# Patient Record
Sex: MALE | Race: WHITE | NOT HISPANIC OR LATINO
[De-identification: names, ages, dates, MRNs, and addresses within clinical notes are randomized per-mention and may not be internally consistent; named-entity substitution may affect disease eponyms.]

---

## 2018-06-12 PROBLEM — Z00.00 ENCOUNTER FOR PREVENTIVE HEALTH EXAMINATION: Status: ACTIVE | Noted: 2018-06-12

## 2018-06-13 ENCOUNTER — APPOINTMENT (OUTPATIENT)
Dept: ORTHOPEDIC SURGERY | Facility: CLINIC | Age: 38
End: 2018-06-13
Payer: MEDICAID

## 2018-06-13 VITALS
HEIGHT: 66 IN | BODY MASS INDEX: 30.05 KG/M2 | DIASTOLIC BLOOD PRESSURE: 82 MMHG | SYSTOLIC BLOOD PRESSURE: 120 MMHG | WEIGHT: 187 LBS | HEART RATE: 74 BPM

## 2018-06-13 DIAGNOSIS — M25.512 PAIN IN RIGHT SHOULDER: ICD-10-CM

## 2018-06-13 DIAGNOSIS — M25.511 PAIN IN RIGHT SHOULDER: ICD-10-CM

## 2018-06-13 DIAGNOSIS — M25.311 OTHER INSTABILITY, RIGHT SHOULDER: ICD-10-CM

## 2018-06-13 PROCEDURE — 99203 OFFICE O/P NEW LOW 30 MIN: CPT

## 2018-06-13 PROCEDURE — 73030 X-RAY EXAM OF SHOULDER: CPT | Mod: RT

## 2018-06-15 ENCOUNTER — APPOINTMENT (OUTPATIENT)
Dept: ORTHOPEDIC SURGERY | Facility: CLINIC | Age: 38
End: 2018-06-15
Payer: MEDICAID

## 2018-06-15 DIAGNOSIS — M54.2 CERVICALGIA: ICD-10-CM

## 2018-06-15 DIAGNOSIS — M54.6 PAIN IN THORACIC SPINE: ICD-10-CM

## 2018-06-15 PROCEDURE — 99214 OFFICE O/P EST MOD 30 MIN: CPT

## 2018-06-15 PROCEDURE — 72040 X-RAY EXAM NECK SPINE 2-3 VW: CPT

## 2018-06-15 PROCEDURE — 72070 X-RAY EXAM THORAC SPINE 2VWS: CPT

## 2019-02-13 ENCOUNTER — APPOINTMENT (OUTPATIENT)
Dept: INTERNAL MEDICINE | Facility: CLINIC | Age: 39
End: 2019-02-13
Payer: COMMERCIAL

## 2019-02-13 VITALS
HEIGHT: 64.5 IN | HEART RATE: 71 BPM | OXYGEN SATURATION: 96 % | WEIGHT: 200 LBS | SYSTOLIC BLOOD PRESSURE: 130 MMHG | BODY MASS INDEX: 33.73 KG/M2 | DIASTOLIC BLOOD PRESSURE: 80 MMHG | RESPIRATION RATE: 14 BRPM | TEMPERATURE: 98 F

## 2019-02-13 PROCEDURE — 99204 OFFICE O/P NEW MOD 45 MIN: CPT

## 2019-02-13 RX ORDER — AMOXICILLIN AND CLAVULANATE POTASSIUM 875; 125 MG/1; MG/1
875-125 TABLET, COATED ORAL
Qty: 20 | Refills: 0 | Status: DISCONTINUED | COMMUNITY
Start: 2018-01-28 | End: 2019-02-13

## 2019-02-13 NOTE — PHYSICAL EXAM
[No Acute Distress] : no acute distress [Well Nourished] : well nourished [Well Developed] : well developed [Well-Appearing] : well-appearing [Normal Voice/Communication] : normal voice/communication [Normal Sclera/Conjunctiva] : normal sclera/conjunctiva [PERRL] : pupils equal round and reactive to light [EOMI] : extraocular movements intact [Normal Outer Ear/Nose] : the outer ears and nose were normal in appearance [Normal Oropharynx] : the oropharynx was normal [Normal TMs] : both tympanic membranes were normal [No JVD] : no jugular venous distention [Supple] : supple [No Lymphadenopathy] : no lymphadenopathy [Thyroid Normal, No Nodules] : the thyroid was normal and there were no nodules present [No Respiratory Distress] : no respiratory distress  [Clear to Auscultation] : lungs were clear to auscultation bilaterally [No Accessory Muscle Use] : no accessory muscle use [Normal Rate] : normal rate  [Regular Rhythm] : with a regular rhythm [Normal S1, S2] : normal S1 and S2 [No Murmur] : no murmur heard [No Carotid Bruits] : no carotid bruits [No Abdominal Bruit] : a ~M bruit was not heard ~T in the abdomen [No Varicosities] : no varicosities [Pedal Pulses Present] : the pedal pulses are present [No Edema] : there was no peripheral edema [No Extremity Clubbing/Cyanosis] : no extremity clubbing/cyanosis [No Palpable Aorta] : no palpable aorta [Soft] : abdomen soft [Non Tender] : non-tender [Non-distended] : non-distended [No Masses] : no abdominal mass palpated [No HSM] : no HSM [Normal Bowel Sounds] : normal bowel sounds [Normal Posterior Cervical Nodes] : no posterior cervical lymphadenopathy [Normal Anterior Cervical Nodes] : no anterior cervical lymphadenopathy [No CVA Tenderness] : no CVA  tenderness [No Spinal Tenderness] : no spinal tenderness [No Joint Swelling] : no joint swelling [Grossly Normal Strength/Tone] : grossly normal strength/tone [No Rash] : no rash [Normal Gait] : normal gait [Coordination Grossly Intact] : coordination grossly intact [No Focal Deficits] : no focal deficits [Deep Tendon Reflexes (DTR)] : deep tendon reflexes were 2+ and symmetric [Speech Grossly Normal] : speech grossly normal [Memory Grossly Normal] : memory grossly normal [Normal Affect] : the affect was normal [Alert and Oriented x3] : oriented to person, place, and time [Normal Insight/Judgement] : insight and judgment were intact

## 2019-03-30 ENCOUNTER — APPOINTMENT (OUTPATIENT)
Dept: INTERNAL MEDICINE | Facility: CLINIC | Age: 39
End: 2019-03-30
Payer: COMMERCIAL

## 2019-03-30 VITALS
DIASTOLIC BLOOD PRESSURE: 80 MMHG | SYSTOLIC BLOOD PRESSURE: 130 MMHG | OXYGEN SATURATION: 98 % | RESPIRATION RATE: 14 BRPM | HEART RATE: 87 BPM | WEIGHT: 200 LBS | TEMPERATURE: 98 F | BODY MASS INDEX: 33.73 KG/M2 | HEIGHT: 64.5 IN

## 2019-03-30 PROCEDURE — 99214 OFFICE O/P EST MOD 30 MIN: CPT

## 2019-03-30 NOTE — PHYSICAL EXAM

## 2019-04-29 ENCOUNTER — APPOINTMENT (OUTPATIENT)
Dept: INTERNAL MEDICINE | Facility: CLINIC | Age: 39
End: 2019-04-29
Payer: COMMERCIAL

## 2019-04-29 VITALS
DIASTOLIC BLOOD PRESSURE: 70 MMHG | WEIGHT: 202 LBS | SYSTOLIC BLOOD PRESSURE: 120 MMHG | TEMPERATURE: 97.8 F | OXYGEN SATURATION: 98 % | BODY MASS INDEX: 34.07 KG/M2 | HEART RATE: 78 BPM | RESPIRATION RATE: 14 BRPM | HEIGHT: 64.5 IN

## 2019-04-29 PROCEDURE — 99214 OFFICE O/P EST MOD 30 MIN: CPT

## 2019-04-29 RX ORDER — PREDNISONE 20 MG/1
20 TABLET ORAL
Qty: 10 | Refills: 0 | Status: DISCONTINUED | COMMUNITY
Start: 2019-01-30

## 2019-06-06 ENCOUNTER — MEDICATION RENEWAL (OUTPATIENT)
Age: 39
End: 2019-06-06

## 2019-07-01 ENCOUNTER — APPOINTMENT (OUTPATIENT)
Dept: INTERNAL MEDICINE | Facility: CLINIC | Age: 39
End: 2019-07-01
Payer: COMMERCIAL

## 2019-07-01 ENCOUNTER — APPOINTMENT (OUTPATIENT)
Dept: INTERNAL MEDICINE | Facility: CLINIC | Age: 39
End: 2019-07-01

## 2019-07-01 VITALS
HEIGHT: 64.5 IN | WEIGHT: 202 LBS | DIASTOLIC BLOOD PRESSURE: 80 MMHG | RESPIRATION RATE: 14 BRPM | HEART RATE: 95 BPM | OXYGEN SATURATION: 98 % | TEMPERATURE: 98.2 F | SYSTOLIC BLOOD PRESSURE: 130 MMHG | BODY MASS INDEX: 34.07 KG/M2

## 2019-07-01 PROCEDURE — 99214 OFFICE O/P EST MOD 30 MIN: CPT

## 2019-07-01 RX ORDER — CEFDINIR 300 MG/1
300 CAPSULE ORAL
Qty: 20 | Refills: 0 | Status: DISCONTINUED | COMMUNITY
Start: 2019-06-03

## 2019-07-01 NOTE — HEALTH RISK ASSESSMENT
[No] : In the past 12 months have you used drugs other than those required for medical reasons? No [0] : 2) Feeling down, depressed, or hopeless: Not at all (0) [] : No

## 2019-07-01 NOTE — PHYSICAL EXAM
[No Acute Distress] : no acute distress [Well Nourished] : well nourished [Well Developed] : well developed [Well-Appearing] : well-appearing [Normal Sclera/Conjunctiva] : normal sclera/conjunctiva [PERRL] : pupils equal round and reactive to light [EOMI] : extraocular movements intact [Normal Outer Ear/Nose] : the outer ears and nose were normal in appearance [Normal Oropharynx] : the oropharynx was normal [No JVD] : no jugular venous distention [No Lymphadenopathy] : no lymphadenopathy [Supple] : supple [Thyroid Normal, No Nodules] : the thyroid was normal and there were no nodules present [No Respiratory Distress] : no respiratory distress  [No Accessory Muscle Use] : no accessory muscle use [Clear to Auscultation] : lungs were clear to auscultation bilaterally [Normal Rate] : normal rate  [Regular Rhythm] : with a regular rhythm [Normal S1, S2] : normal S1 and S2 [No Murmur] : no murmur heard [No Carotid Bruits] : no carotid bruits [No Abdominal Bruit] : a ~M bruit was not heard ~T in the abdomen [No Varicosities] : no varicosities [Pedal Pulses Present] : the pedal pulses are present [No Edema] : there was no peripheral edema [No Palpable Aorta] : no palpable aorta [No Extremity Clubbing/Cyanosis] : no extremity clubbing/cyanosis [Soft] : abdomen soft [Non Tender] : non-tender [Non-distended] : non-distended [No Masses] : no abdominal mass palpated [No HSM] : no HSM [Normal Bowel Sounds] : normal bowel sounds [Normal Posterior Cervical Nodes] : no posterior cervical lymphadenopathy [Normal Anterior Cervical Nodes] : no anterior cervical lymphadenopathy [No CVA Tenderness] : no CVA  tenderness [No Spinal Tenderness] : no spinal tenderness [No Joint Swelling] : no joint swelling [Grossly Normal Strength/Tone] : grossly normal strength/tone [No Rash] : no rash [Coordination Grossly Intact] : coordination grossly intact [No Focal Deficits] : no focal deficits [Normal Gait] : normal gait [Deep Tendon Reflexes (DTR)] : deep tendon reflexes were 2+ and symmetric [Normal Affect] : the affect was normal [Normal Insight/Judgement] : insight and judgment were intact

## 2019-07-29 ENCOUNTER — MEDICATION RENEWAL (OUTPATIENT)
Age: 39
End: 2019-07-29

## 2019-08-21 ENCOUNTER — APPOINTMENT (OUTPATIENT)
Dept: INTERNAL MEDICINE | Facility: CLINIC | Age: 39
End: 2019-08-21
Payer: COMMERCIAL

## 2019-08-21 VITALS
DIASTOLIC BLOOD PRESSURE: 90 MMHG | WEIGHT: 200 LBS | SYSTOLIC BLOOD PRESSURE: 120 MMHG | BODY MASS INDEX: 33.8 KG/M2 | OXYGEN SATURATION: 97 % | HEART RATE: 84 BPM | TEMPERATURE: 97.7 F

## 2019-08-21 PROCEDURE — 99214 OFFICE O/P EST MOD 30 MIN: CPT

## 2019-09-20 ENCOUNTER — MEDICATION RENEWAL (OUTPATIENT)
Age: 39
End: 2019-09-20

## 2019-09-30 ENCOUNTER — APPOINTMENT (OUTPATIENT)
Dept: INTERNAL MEDICINE | Facility: CLINIC | Age: 39
End: 2019-09-30
Payer: COMMERCIAL

## 2019-09-30 ENCOUNTER — APPOINTMENT (OUTPATIENT)
Dept: INTERNAL MEDICINE | Facility: CLINIC | Age: 39
End: 2019-09-30

## 2019-09-30 VITALS
TEMPERATURE: 98 F | HEIGHT: 64.5 IN | OXYGEN SATURATION: 98 % | RESPIRATION RATE: 14 BRPM | WEIGHT: 202 LBS | SYSTOLIC BLOOD PRESSURE: 118 MMHG | BODY MASS INDEX: 34.07 KG/M2 | DIASTOLIC BLOOD PRESSURE: 76 MMHG | HEART RATE: 76 BPM

## 2019-09-30 PROCEDURE — 99214 OFFICE O/P EST MOD 30 MIN: CPT

## 2019-09-30 NOTE — PHYSICAL EXAM
[No Acute Distress] : no acute distress [Well Nourished] : well nourished [Well Developed] : well developed [Well-Appearing] : well-appearing [Normal Sclera/Conjunctiva] : normal sclera/conjunctiva [PERRL] : pupils equal round and reactive to light [EOMI] : extraocular movements intact [Normal Outer Ear/Nose] : the outer ears and nose were normal in appearance [Normal Oropharynx] : the oropharynx was normal [No JVD] : no jugular venous distention [No Lymphadenopathy] : no lymphadenopathy [Supple] : supple [Thyroid Normal, No Nodules] : the thyroid was normal and there were no nodules present [No Respiratory Distress] : no respiratory distress  [No Accessory Muscle Use] : no accessory muscle use [Clear to Auscultation] : lungs were clear to auscultation bilaterally [Normal Rate] : normal rate  [Regular Rhythm] : with a regular rhythm [Normal S1, S2] : normal S1 and S2 [No Murmur] : no murmur heard [No Carotid Bruits] : no carotid bruits [No Abdominal Bruit] : a ~M bruit was not heard ~T in the abdomen [No Varicosities] : no varicosities [Pedal Pulses Present] : the pedal pulses are present [No Edema] : there was no peripheral edema [No Palpable Aorta] : no palpable aorta [No Extremity Clubbing/Cyanosis] : no extremity clubbing/cyanosis [Soft] : abdomen soft [Non Tender] : non-tender [Non-distended] : non-distended [No Masses] : no abdominal mass palpated [No HSM] : no HSM [Normal Bowel Sounds] : normal bowel sounds [Normal Posterior Cervical Nodes] : no posterior cervical lymphadenopathy [Normal Anterior Cervical Nodes] : no anterior cervical lymphadenopathy [No CVA Tenderness] : no CVA  tenderness [No Spinal Tenderness] : no spinal tenderness [No Joint Swelling] : no joint swelling [Grossly Normal Strength/Tone] : grossly normal strength/tone [Coordination Grossly Intact] : coordination grossly intact [No Rash] : no rash [Normal Gait] : normal gait [No Focal Deficits] : no focal deficits [Deep Tendon Reflexes (DTR)] : deep tendon reflexes were 2+ and symmetric [Normal Affect] : the affect was normal [Normal Insight/Judgement] : insight and judgment were intact

## 2019-10-28 ENCOUNTER — MEDICATION RENEWAL (OUTPATIENT)
Age: 39
End: 2019-10-28

## 2019-11-04 ENCOUNTER — APPOINTMENT (OUTPATIENT)
Dept: INTERNAL MEDICINE | Facility: CLINIC | Age: 39
End: 2019-11-04

## 2019-11-05 ENCOUNTER — APPOINTMENT (OUTPATIENT)
Dept: INTERNAL MEDICINE | Facility: CLINIC | Age: 39
End: 2019-11-05
Payer: COMMERCIAL

## 2019-11-05 VITALS
DIASTOLIC BLOOD PRESSURE: 90 MMHG | RESPIRATION RATE: 14 BRPM | OXYGEN SATURATION: 97 % | SYSTOLIC BLOOD PRESSURE: 120 MMHG | HEART RATE: 86 BPM | HEIGHT: 65 IN | BODY MASS INDEX: 33.32 KG/M2 | WEIGHT: 200 LBS | TEMPERATURE: 98.5 F

## 2019-11-05 PROCEDURE — 99214 OFFICE O/P EST MOD 30 MIN: CPT

## 2019-11-05 RX ORDER — METHYLPREDNISOLONE 4 MG/1
4 TABLET ORAL
Qty: 21 | Refills: 0 | Status: DISCONTINUED | COMMUNITY
Start: 2019-10-20

## 2019-11-11 ENCOUNTER — APPOINTMENT (OUTPATIENT)
Dept: PSYCHIATRY | Facility: CLINIC | Age: 39
End: 2019-11-11
Payer: COMMERCIAL

## 2019-11-11 PROCEDURE — 99205 OFFICE O/P NEW HI 60 MIN: CPT

## 2019-12-04 ENCOUNTER — RX RENEWAL (OUTPATIENT)
Age: 39
End: 2019-12-04

## 2019-12-13 ENCOUNTER — APPOINTMENT (OUTPATIENT)
Dept: PSYCHIATRY | Facility: CLINIC | Age: 39
End: 2019-12-13
Payer: COMMERCIAL

## 2019-12-13 PROCEDURE — 99214 OFFICE O/P EST MOD 30 MIN: CPT

## 2020-01-03 ENCOUNTER — APPOINTMENT (OUTPATIENT)
Dept: INTERNAL MEDICINE | Facility: CLINIC | Age: 40
End: 2020-01-03
Payer: COMMERCIAL

## 2020-01-03 VITALS
RESPIRATION RATE: 14 BRPM | BODY MASS INDEX: 33.32 KG/M2 | HEART RATE: 79 BPM | SYSTOLIC BLOOD PRESSURE: 112 MMHG | OXYGEN SATURATION: 96 % | TEMPERATURE: 97.4 F | DIASTOLIC BLOOD PRESSURE: 80 MMHG | HEIGHT: 65 IN | WEIGHT: 200 LBS

## 2020-01-03 DIAGNOSIS — Z87.09 PERSONAL HISTORY OF OTHER DISEASES OF THE RESPIRATORY SYSTEM: ICD-10-CM

## 2020-01-03 DIAGNOSIS — J30.9 ALLERGIC RHINITIS, UNSPECIFIED: ICD-10-CM

## 2020-01-03 PROCEDURE — 99214 OFFICE O/P EST MOD 30 MIN: CPT

## 2020-01-03 RX ORDER — AZELASTINE HYDROCHLORIDE 205.5 UG/1
0.15 SPRAY, METERED NASAL DAILY
Qty: 1 | Refills: 1 | Status: ACTIVE | COMMUNITY
Start: 2020-01-03 | End: 1900-01-01

## 2020-01-03 NOTE — PHYSICAL EXAM
[No Acute Distress] : no acute distress [Well Nourished] : well nourished [Well Developed] : well developed [Well-Appearing] : well-appearing [Normal Sclera/Conjunctiva] : normal sclera/conjunctiva [PERRL] : pupils equal round and reactive to light [EOMI] : extraocular movements intact [Normal Outer Ear/Nose] : the outer ears and nose were normal in appearance [Normal Oropharynx] : the oropharynx was normal [No Lymphadenopathy] : no lymphadenopathy [No JVD] : no jugular venous distention [No Respiratory Distress] : no respiratory distress  [Supple] : supple [Thyroid Normal, No Nodules] : the thyroid was normal and there were no nodules present [Normal Rate] : normal rate  [Clear to Auscultation] : lungs were clear to auscultation bilaterally [No Accessory Muscle Use] : no accessory muscle use [Normal S1, S2] : normal S1 and S2 [Regular Rhythm] : with a regular rhythm [No Murmur] : no murmur heard [No Carotid Bruits] : no carotid bruits [No Abdominal Bruit] : a ~M bruit was not heard ~T in the abdomen [No Varicosities] : no varicosities [Pedal Pulses Present] : the pedal pulses are present [No Extremity Clubbing/Cyanosis] : no extremity clubbing/cyanosis [No Palpable Aorta] : no palpable aorta [No Edema] : there was no peripheral edema [Non Tender] : non-tender [Soft] : abdomen soft [No HSM] : no HSM [No Masses] : no abdominal mass palpated [Non-distended] : non-distended [Normal Posterior Cervical Nodes] : no posterior cervical lymphadenopathy [Normal Anterior Cervical Nodes] : no anterior cervical lymphadenopathy [Normal Bowel Sounds] : normal bowel sounds [No Spinal Tenderness] : no spinal tenderness [No CVA Tenderness] : no CVA  tenderness [No Joint Swelling] : no joint swelling [No Rash] : no rash [Grossly Normal Strength/Tone] : grossly normal strength/tone [Normal Gait] : normal gait [Coordination Grossly Intact] : coordination grossly intact [No Focal Deficits] : no focal deficits [Normal Insight/Judgement] : insight and judgment were intact [Deep Tendon Reflexes (DTR)] : deep tendon reflexes were 2+ and symmetric [Normal Affect] : the affect was normal

## 2020-01-08 ENCOUNTER — APPOINTMENT (OUTPATIENT)
Dept: PSYCHIATRY | Facility: CLINIC | Age: 40
End: 2020-01-08
Payer: COMMERCIAL

## 2020-01-08 PROCEDURE — 99214 OFFICE O/P EST MOD 30 MIN: CPT

## 2020-01-08 RX ORDER — ALPRAZOLAM 0.5 MG/1
0.5 TABLET ORAL
Qty: 30 | Refills: 0 | Status: COMPLETED | COMMUNITY
Start: 2019-12-13 | End: 2020-01-08

## 2020-01-17 ENCOUNTER — APPOINTMENT (OUTPATIENT)
Dept: PSYCHIATRY | Facility: CLINIC | Age: 40
End: 2020-01-17

## 2020-02-03 ENCOUNTER — APPOINTMENT (OUTPATIENT)
Dept: PSYCHIATRY | Facility: CLINIC | Age: 40
End: 2020-02-03

## 2020-02-04 ENCOUNTER — APPOINTMENT (OUTPATIENT)
Dept: PSYCHIATRY | Facility: CLINIC | Age: 40
End: 2020-02-04
Payer: COMMERCIAL

## 2020-02-04 PROCEDURE — 99214 OFFICE O/P EST MOD 30 MIN: CPT

## 2020-02-04 PROCEDURE — 90791 PSYCH DIAGNOSTIC EVALUATION: CPT

## 2020-02-25 ENCOUNTER — APPOINTMENT (OUTPATIENT)
Dept: PSYCHIATRY | Facility: CLINIC | Age: 40
End: 2020-02-25
Payer: COMMERCIAL

## 2020-02-25 PROCEDURE — 99214 OFFICE O/P EST MOD 30 MIN: CPT

## 2020-02-25 PROCEDURE — 90837 PSYTX W PT 60 MINUTES: CPT

## 2020-03-24 ENCOUNTER — APPOINTMENT (OUTPATIENT)
Dept: PSYCHIATRY | Facility: CLINIC | Age: 40
End: 2020-03-24
Payer: COMMERCIAL

## 2020-03-24 PROCEDURE — 98968 PH1 ASSMT&MGMT NQHP 21-30: CPT

## 2020-03-24 PROCEDURE — 99442: CPT

## 2020-03-31 ENCOUNTER — APPOINTMENT (OUTPATIENT)
Dept: PSYCHIATRY | Facility: CLINIC | Age: 40
End: 2020-03-31
Payer: COMMERCIAL

## 2020-03-31 PROCEDURE — 99441: CPT

## 2020-04-07 ENCOUNTER — APPOINTMENT (OUTPATIENT)
Dept: PSYCHIATRY | Facility: CLINIC | Age: 40
End: 2020-04-07
Payer: COMMERCIAL

## 2020-04-07 PROCEDURE — 90837 PSYTX W PT 60 MINUTES: CPT | Mod: 95

## 2020-04-14 ENCOUNTER — APPOINTMENT (OUTPATIENT)
Dept: PSYCHIATRY | Facility: CLINIC | Age: 40
End: 2020-04-14
Payer: COMMERCIAL

## 2020-04-14 PROCEDURE — 90837 PSYTX W PT 60 MINUTES: CPT | Mod: 95

## 2020-04-21 ENCOUNTER — APPOINTMENT (OUTPATIENT)
Dept: PSYCHIATRY | Facility: CLINIC | Age: 40
End: 2020-04-21

## 2020-04-27 ENCOUNTER — APPOINTMENT (OUTPATIENT)
Dept: PSYCHIATRY | Facility: CLINIC | Age: 40
End: 2020-04-27
Payer: COMMERCIAL

## 2020-04-27 PROCEDURE — 90837 PSYTX W PT 60 MINUTES: CPT | Mod: 95

## 2020-05-04 ENCOUNTER — APPOINTMENT (OUTPATIENT)
Dept: PSYCHIATRY | Facility: CLINIC | Age: 40
End: 2020-05-04
Payer: COMMERCIAL

## 2020-05-04 PROCEDURE — 90837 PSYTX W PT 60 MINUTES: CPT | Mod: 95

## 2020-05-13 ENCOUNTER — APPOINTMENT (OUTPATIENT)
Dept: PSYCHIATRY | Facility: CLINIC | Age: 40
End: 2020-05-13
Payer: COMMERCIAL

## 2020-05-13 PROCEDURE — 90834 PSYTX W PT 45 MINUTES: CPT | Mod: 95

## 2020-05-27 ENCOUNTER — APPOINTMENT (OUTPATIENT)
Dept: PSYCHIATRY | Facility: CLINIC | Age: 40
End: 2020-05-27
Payer: COMMERCIAL

## 2020-05-27 PROCEDURE — 90837 PSYTX W PT 60 MINUTES: CPT | Mod: 95

## 2020-06-02 ENCOUNTER — APPOINTMENT (OUTPATIENT)
Dept: PSYCHIATRY | Facility: CLINIC | Age: 40
End: 2020-06-02
Payer: COMMERCIAL

## 2020-06-02 PROCEDURE — 99442: CPT

## 2020-06-03 ENCOUNTER — APPOINTMENT (OUTPATIENT)
Dept: PSYCHIATRY | Facility: CLINIC | Age: 40
End: 2020-06-03
Payer: COMMERCIAL

## 2020-06-03 PROCEDURE — 90837 PSYTX W PT 60 MINUTES: CPT | Mod: 95

## 2020-06-18 ENCOUNTER — APPOINTMENT (OUTPATIENT)
Dept: PSYCHIATRY | Facility: CLINIC | Age: 40
End: 2020-06-18
Payer: COMMERCIAL

## 2020-06-18 PROCEDURE — 90837 PSYTX W PT 60 MINUTES: CPT | Mod: 95

## 2020-06-30 ENCOUNTER — APPOINTMENT (OUTPATIENT)
Dept: PSYCHIATRY | Facility: CLINIC | Age: 40
End: 2020-06-30

## 2020-07-14 ENCOUNTER — APPOINTMENT (OUTPATIENT)
Dept: PSYCHIATRY | Facility: CLINIC | Age: 40
End: 2020-07-14
Payer: COMMERCIAL

## 2020-07-14 PROCEDURE — 99214 OFFICE O/P EST MOD 30 MIN: CPT | Mod: 95

## 2020-07-15 ENCOUNTER — APPOINTMENT (OUTPATIENT)
Dept: PSYCHIATRY | Facility: CLINIC | Age: 40
End: 2020-07-15
Payer: COMMERCIAL

## 2020-07-15 PROCEDURE — 90837 PSYTX W PT 60 MINUTES: CPT | Mod: 95

## 2020-07-24 ENCOUNTER — APPOINTMENT (OUTPATIENT)
Dept: PSYCHIATRY | Facility: CLINIC | Age: 40
End: 2020-07-24
Payer: COMMERCIAL

## 2020-07-24 PROCEDURE — 90837 PSYTX W PT 60 MINUTES: CPT | Mod: 95

## 2020-08-14 ENCOUNTER — APPOINTMENT (OUTPATIENT)
Dept: PSYCHIATRY | Facility: CLINIC | Age: 40
End: 2020-08-14
Payer: COMMERCIAL

## 2020-08-14 PROCEDURE — 90837 PSYTX W PT 60 MINUTES: CPT | Mod: 95

## 2020-09-10 DIAGNOSIS — Z23 ENCOUNTER FOR IMMUNIZATION: ICD-10-CM

## 2020-09-15 ENCOUNTER — APPOINTMENT (OUTPATIENT)
Dept: PSYCHIATRY | Facility: CLINIC | Age: 40
End: 2020-09-15

## 2020-10-07 ENCOUNTER — APPOINTMENT (OUTPATIENT)
Dept: PSYCHIATRY | Facility: CLINIC | Age: 40
End: 2020-10-07
Payer: COMMERCIAL

## 2020-10-07 PROCEDURE — 90837 PSYTX W PT 60 MINUTES: CPT

## 2020-10-07 PROCEDURE — 99214 OFFICE O/P EST MOD 30 MIN: CPT

## 2020-11-05 ENCOUNTER — APPOINTMENT (OUTPATIENT)
Dept: PSYCHIATRY | Facility: CLINIC | Age: 40
End: 2020-11-05
Payer: COMMERCIAL

## 2020-11-05 DIAGNOSIS — F41.8 OTHER SPECIFIED ANXIETY DISORDERS: ICD-10-CM

## 2020-11-05 PROCEDURE — 90837 PSYTX W PT 60 MINUTES: CPT

## 2020-11-05 PROCEDURE — 99072 ADDL SUPL MATRL&STAF TM PHE: CPT

## 2020-11-25 ENCOUNTER — APPOINTMENT (OUTPATIENT)
Dept: PSYCHIATRY | Facility: CLINIC | Age: 40
End: 2020-11-25
Payer: COMMERCIAL

## 2020-11-25 PROCEDURE — 99214 OFFICE O/P EST MOD 30 MIN: CPT

## 2020-11-30 ENCOUNTER — APPOINTMENT (OUTPATIENT)
Dept: PSYCHIATRY | Facility: CLINIC | Age: 40
End: 2020-11-30
Payer: COMMERCIAL

## 2020-11-30 PROCEDURE — 99072 ADDL SUPL MATRL&STAF TM PHE: CPT

## 2020-11-30 PROCEDURE — 90837 PSYTX W PT 60 MINUTES: CPT

## 2020-12-23 ENCOUNTER — APPOINTMENT (OUTPATIENT)
Dept: PSYCHIATRY | Facility: CLINIC | Age: 40
End: 2020-12-23

## 2020-12-23 PROBLEM — Z87.09 HISTORY OF ACUTE SINUSITIS: Status: RESOLVED | Noted: 2020-01-03 | Resolved: 2020-12-23

## 2020-12-28 ENCOUNTER — APPOINTMENT (OUTPATIENT)
Dept: PSYCHIATRY | Facility: CLINIC | Age: 40
End: 2020-12-28

## 2021-01-06 ENCOUNTER — APPOINTMENT (OUTPATIENT)
Dept: PSYCHIATRY | Facility: CLINIC | Age: 41
End: 2021-01-06
Payer: COMMERCIAL

## 2021-01-06 PROCEDURE — 99214 OFFICE O/P EST MOD 30 MIN: CPT

## 2021-01-06 PROCEDURE — 99072 ADDL SUPL MATRL&STAF TM PHE: CPT

## 2021-01-06 RX ORDER — ALPRAZOLAM 0.25 MG/1
0.25 TABLET ORAL
Qty: 30 | Refills: 0 | Status: COMPLETED | COMMUNITY
Start: 2020-01-21 | End: 2021-01-06

## 2021-02-06 ENCOUNTER — RX RENEWAL (OUTPATIENT)
Age: 41
End: 2021-02-06

## 2021-02-17 ENCOUNTER — APPOINTMENT (OUTPATIENT)
Dept: PSYCHIATRY | Facility: CLINIC | Age: 41
End: 2021-02-17
Payer: COMMERCIAL

## 2021-02-17 PROCEDURE — 99214 OFFICE O/P EST MOD 30 MIN: CPT

## 2021-02-17 PROCEDURE — 99072 ADDL SUPL MATRL&STAF TM PHE: CPT

## 2021-04-14 ENCOUNTER — APPOINTMENT (OUTPATIENT)
Dept: PSYCHIATRY | Facility: CLINIC | Age: 41
End: 2021-04-14

## 2021-04-19 ENCOUNTER — RX RENEWAL (OUTPATIENT)
Age: 41
End: 2021-04-19

## 2021-05-03 ENCOUNTER — APPOINTMENT (OUTPATIENT)
Dept: PSYCHIATRY | Facility: CLINIC | Age: 41
End: 2021-05-03
Payer: COMMERCIAL

## 2021-05-03 PROCEDURE — 99214 OFFICE O/P EST MOD 30 MIN: CPT

## 2021-05-03 PROCEDURE — 99072 ADDL SUPL MATRL&STAF TM PHE: CPT

## 2021-06-14 ENCOUNTER — APPOINTMENT (OUTPATIENT)
Dept: PSYCHIATRY | Facility: CLINIC | Age: 41
End: 2021-06-14
Payer: COMMERCIAL

## 2021-06-14 PROCEDURE — 99072 ADDL SUPL MATRL&STAF TM PHE: CPT

## 2021-06-14 PROCEDURE — 99214 OFFICE O/P EST MOD 30 MIN: CPT

## 2021-07-16 ENCOUNTER — RX RENEWAL (OUTPATIENT)
Age: 41
End: 2021-07-16

## 2021-08-09 ENCOUNTER — APPOINTMENT (OUTPATIENT)
Dept: PSYCHIATRY | Facility: CLINIC | Age: 41
End: 2021-08-09
Payer: COMMERCIAL

## 2021-08-09 PROCEDURE — 99214 OFFICE O/P EST MOD 30 MIN: CPT

## 2021-09-14 ENCOUNTER — APPOINTMENT (OUTPATIENT)
Dept: PSYCHIATRY | Facility: CLINIC | Age: 41
End: 2021-09-14
Payer: COMMERCIAL

## 2021-09-14 PROCEDURE — 99214 OFFICE O/P EST MOD 30 MIN: CPT

## 2021-10-12 ENCOUNTER — APPOINTMENT (OUTPATIENT)
Dept: PSYCHIATRY | Facility: CLINIC | Age: 41
End: 2021-10-12

## 2021-11-15 ENCOUNTER — APPOINTMENT (OUTPATIENT)
Dept: PSYCHIATRY | Facility: CLINIC | Age: 41
End: 2021-11-15
Payer: COMMERCIAL

## 2021-11-15 PROCEDURE — 99214 OFFICE O/P EST MOD 30 MIN: CPT

## 2022-01-11 ENCOUNTER — APPOINTMENT (OUTPATIENT)
Dept: PSYCHIATRY | Facility: CLINIC | Age: 42
End: 2022-01-11
Payer: COMMERCIAL

## 2022-01-11 PROCEDURE — 99214 OFFICE O/P EST MOD 30 MIN: CPT

## 2022-01-12 ENCOUNTER — RX RENEWAL (OUTPATIENT)
Age: 42
End: 2022-01-12

## 2022-02-09 ENCOUNTER — APPOINTMENT (OUTPATIENT)
Dept: PSYCHIATRY | Facility: CLINIC | Age: 42
End: 2022-02-09
Payer: COMMERCIAL

## 2022-02-09 PROCEDURE — 99214 OFFICE O/P EST MOD 30 MIN: CPT | Mod: 95

## 2022-03-15 ENCOUNTER — RX RENEWAL (OUTPATIENT)
Age: 42
End: 2022-03-15

## 2022-03-24 ENCOUNTER — RX RENEWAL (OUTPATIENT)
Age: 42
End: 2022-03-24

## 2022-03-28 ENCOUNTER — APPOINTMENT (OUTPATIENT)
Dept: PSYCHIATRY | Facility: CLINIC | Age: 42
End: 2022-03-28

## 2022-04-26 ENCOUNTER — APPOINTMENT (OUTPATIENT)
Dept: PSYCHIATRY | Facility: CLINIC | Age: 42
End: 2022-04-26
Payer: COMMERCIAL

## 2022-04-26 PROCEDURE — 99214 OFFICE O/P EST MOD 30 MIN: CPT

## 2022-06-08 ENCOUNTER — APPOINTMENT (OUTPATIENT)
Dept: PSYCHIATRY | Facility: CLINIC | Age: 42
End: 2022-06-08
Payer: COMMERCIAL

## 2022-06-08 PROCEDURE — 99214 OFFICE O/P EST MOD 30 MIN: CPT

## 2022-06-08 RX ORDER — ESCITALOPRAM OXALATE 10 MG/1
10 TABLET ORAL
Qty: 90 | Refills: 0 | Status: DISCONTINUED | COMMUNITY
Start: 2019-09-18 | End: 2022-06-08

## 2022-06-08 RX ORDER — FLUTICASONE PROPIONATE 50 UG/1
50 SPRAY, METERED NASAL
Qty: 16 | Refills: 0 | Status: DISCONTINUED | COMMUNITY
Start: 2018-01-28 | End: 2022-06-08

## 2022-07-20 ENCOUNTER — APPOINTMENT (OUTPATIENT)
Dept: PSYCHIATRY | Facility: CLINIC | Age: 42
End: 2022-07-20

## 2022-07-20 PROCEDURE — 99214 OFFICE O/P EST MOD 30 MIN: CPT

## 2022-08-13 ENCOUNTER — RX RENEWAL (OUTPATIENT)
Age: 42
End: 2022-08-13

## 2022-08-17 ENCOUNTER — APPOINTMENT (OUTPATIENT)
Dept: PSYCHIATRY | Facility: CLINIC | Age: 42
End: 2022-08-17

## 2022-08-17 PROCEDURE — 99214 OFFICE O/P EST MOD 30 MIN: CPT

## 2022-11-02 ENCOUNTER — APPOINTMENT (OUTPATIENT)
Dept: PSYCHIATRY | Facility: CLINIC | Age: 42
End: 2022-11-02

## 2022-11-20 ENCOUNTER — RX RENEWAL (OUTPATIENT)
Age: 42
End: 2022-11-20

## 2022-11-30 ENCOUNTER — APPOINTMENT (OUTPATIENT)
Dept: PSYCHIATRY | Facility: CLINIC | Age: 42
End: 2022-11-30

## 2022-11-30 PROCEDURE — 99214 OFFICE O/P EST MOD 30 MIN: CPT

## 2022-12-02 NOTE — PLAN
[Medication education provided] : Medication education provided. [Rationale for medication choices, possible risks/precautions, benefits, alternative treatment choices, and consequences of non-treatment discussed] : Rationale for medication choices, possible risks/precautions, benefits, alternative treatment choices, and consequences of non-treatment discussed with patient/family/caregiver  [FreeTextEntry5] : Psychoeducation and supportive therapy provided, recommended meds and continuing with slower taper with Xanax discussed, will attempt again in a few months. \par Medication: Xanax 0.75 mg AM, and 1 mg AM, 3 PM and HS, and Gabapentin 200 mg at noon and and 200 mg 6 PM \par Continue Lexapro 20 mg/day\par Educated patient of importance of remaining abstinent from drugs and alcohol. \par Emergency procedures were discussed: pt. educated to call 911 or go to nearest ER for worsening of symptoms/suicidal/homicidal ideation. \par RTC in 3 month or earlier as needed  \par Patient expressed understanding and agreement with above plan. \par \par I stop checked today Reference #: [id number]\par Controlled Rx in past 12 months noted per [place]-\par Rx Written	Rx Dispensed	Drug	Quantity	Days Supply	Prescriber Name	Prescriber Carolyne #	Payment Method	Dispenser\par 11/02/2022	11/04/2022	alprazolam 0.25 mg tablet	30	30	Eula Dillon	[id number]	Insurance	[facility] [id number]\par 11/02/2022	11/04/2022	alprazolam 0.5 mg tablet	30	30	Eula Dillon	[id number]	Insurance	[facility] [id number]\par 11/02/2022	11/04/2022	alprazolam 1 mg tablet	60	15	Eula Dillon	[id number]	Insurance	[facility] [id number]\par 10/04/2022	10/05/2022	alprazolam 1 mg tablet	60	30	Eula Dillon	[id number]	Insurance	[facility] [id number]\par 10/04/2022	10/05/2022	alprazolam 0.25 mg tablet	30	30	Eula Dillon	[id number]	Insurance	[facility] [id number]\par 10/04/2022	10/05/2022	alprazolam 0.5 mg tablet	30	30	Eula Dillon	[id number]	Insurance	[facility] [id number]\par 08/17/2022	09/05/2022	alprazolam 0.25 mg tablet	30	30	Eula Dillon	[id number]	Insurance	[facility] [id number]\par 08/17/2022	09/05/2022	alprazolam 0.5 mg tablet	30	30	Devineni, Eula	[id number]	Insurance	[facility] [id number]\par 08/17/2022	09/05/2022	alprazolam 1 mg tablet	60	30	Devineni, Eula	[id number]	Insurance	[facility] [id number]\par 07/20/2022	08/07/2022	alprazolam 1 mg tablet	60	30	Devineni, Eula	[id number]	Insurance	[facility] [id number]\par 07/20/2022	08/07/2022	alprazolam 0.5 mg tablet	30	30	Devineni, Eula	[id number]	Insurance	[facility] [id number]\par 07/20/2022	08/07/2022	alprazolam 0.25 mg tablet	30	30	Devineni, Eula	[id number]	Insurance	[facility] [id number]\par 07/07/2022	07/10/2022	alprazolam 0.25 mg tablet	30	30	Devineni, Eula	[id number]	Insurance	[facility] [id number]\par 07/07/2022	07/10/2022	alprazolam 0.5 mg tablet	30	30	Devineni, Eula	[id number]	Insurance	[facility] [id number]\par 07/07/2022	07/10/2022	alprazolam 1 mg tablet	60	30	Devineni, Eula	[id number]	Insurance	[facility] [id number]\par 06/08/2022	06/11/2022	alprazolam 0.5 mg tablet	30	30	Devineni, Eula	[id number]	Insurance	[facility] [id number]\par 06/08/2022	06/11/2022	alprazolam 1 mg tablet	60	30	Devineni, Eula	[id number]	Insurance	[facility] [id number]\par 06/08/2022	06/11/2022	alprazolam 0.25 mg tablet	30	30	Devineni, Eula	[id number]	Insurance	[facility] [id number]\par 05/13/2022	05/14/2022	alprazolam 0.5 mg tablet	30	30	Devineni, Eula	[id number]	Insurance	[facility] [id number]\par 05/13/2022	05/14/2022	alprazolam 0.25 mg tablet	30	30	DevineEula aguilar	[id number]	Insurance	[facility] [id number]\par 05/13/2022	05/14/2022	alprazolam 1 mg tablet	60	30	DevineEula aguilar	[id number]	Insurance	[facility] [id number]\par 04/11/2022	04/13/2022	alprazolam 0.5 mg tablet	30	30	DevineEula aguilar	[id number]	Insurance	[facility] [id number]\par 04/11/2022	04/13/2022	alprazolam 1 mg tablet	60	30	DevineEula aguilar	[id number]	Insurance	[facility] [id number]\par 04/11/2022	04/13/2022	alprazolam 0.25 mg tablet	30	30	DevineEula aguilar	[id number]	Insurance	[facility] [id number]\par 03/11/2022	03/11/2022	alprazolam 0.5 mg tablet	30	30	DevineEula aguilar	[id number]	Insurance	[facility] [id number]\par 03/11/2022	03/11/2022	alprazolam 1 mg tablet	60	30	Eula Dillon	[id number]	Insurance	[facility] [id number]\par 03/11/2022	03/11/2022	alprazolam 0.25 mg tablet	30	30	Eula Dillon	[id number]	Insurance	[facility] [id number]\par 02/18/2022	02/26/2022	alprazolam 1 mg tablet	30	15	DevineEula aguilar	[id number]	Insurance	[facility] [id number]\par 02/18/2022	02/26/2022	alprazolam 0.5 mg tablet	15	15	DevineEula aguilar	[id number]	Insurance	[facility] [id number]\par 02/18/2022	02/22/2022	alprazolam 0.25 mg tablet	15	15	DevineEula aguilar	[id number]	Insurance	[facility] [id number]\par 01/24/2022	01/28/2022	alprazolam 1 mg tablet	60	30	KendraneEula aguilar	[id number]	Insurance	[facility] [id number]\par 01/24/2022	01/26/2022	alprazolam 0.5 mg tablet	60	30	Eula Dillon	[id number]	Insurance	[facility] [id number]\par 12/28/2021	12/29/2021	alprazolam 1 mg tablet	90	30	Eula Dillon	[id number]	Insurance	[facility] [id number]\par

## 2022-12-02 NOTE — HISTORY OF PRESENT ILLNESS
[FreeTextEntry1] : Patient reported he had good rest of the summer and the enjoyed taking a break from working.  Patient reported that work continues to be stressful and he is particularly feeling very tired today not because of not getting enough sleep which is still day being very hectic at work.  Patient reported that he is otherwise sleeping good and his appetite is good.  He reported that he is not feeling depressed.  He states that the current medication regimen with the Xanax and gabapentin is helping keep his anxiety symptoms in good control and he has not tried to cut down the Xanax since he returned to work and is feeling more stressed. \par Patient denied any new medical issues or new medication changes since the last visit.  \par Patient denied any alcohol use, any substance abuse.\par

## 2022-12-02 NOTE — DISCUSSION/SUMMARY
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 He reports he is on Lexapro 10 mg/day for past 2 years and when he initially started taking Lexapro he noticed a slight reduction of anxiety. \par He wants to come off Xanax, but is afraid or withdrawal sx and worsening anxiety \par \par 12/13/19: Patient reports he is about the same as last visit, and he tried to lower Xanax dose but not successful, but after education about slow taper and using Gabapentin to help with tapering he states he does want to go ahead with tapering off Xanax, and states he feels comfortable with plan to use Gabapentin to help with taper. \par 1/8/2020: Patient did not start tapering Xanax as planned, reports he was sick and also more stressed at work, and did not want to start new med along with antibiotics.\par 2/4/2020: Patient is taking Xanax 3.5 mg/day for past 2 weeks, and taking gabapentin 100 mg BID with good effect, but a bit apprehensive to cut down Xanax dose further at this time.  \par 2/25/2020: Patient reports he feels more anxious when he does not have to work because he does not have a daily routine and also situation at home on some days make him anxious. He states he wants to lower Xanax, but when he feels stressed, even if not sever he takes Xanax to help cope with the situation. \par 3/24/2020: Patient reports he is managing anxiety and Xanax use better and has used gabapentin as needed for anxiety a few times, in addition to 4 PM and HS dose. \par He states he likes weekly Rx and would like to continue the same \par Considering he is doing well with Xanax dose reduction, plan to further taper Xanax dose. \par 6/2/2020: patient states he is doing okay, though has not taken more than rxed Xanax he states he is "struggling", i.e., eagerly waiting for his 4 PM dose; denies depression and denies panic attacks. \par 7/14/2020: Patient states he is taking Gabapentin as recommended and feels he is able to manage anxiety better and wants to continue with current regimen for another month before attempting to further taper Xanax \par 10/7/2020:Patient states he feels anxiety and depression sx are stable, wants to continue with current regimen and gradually try on his own to reduce Xanax dose to 3 mg/day. \par 11/25/2020: Patient remains stable, feels anxious, but agrees he would like to reduce Xanax dose and agrees to a trial of lowering dose. \par 1/6/2021: Patient was able to reduce Xanax dose down to 1 mg TID, feels gabapentin is helping. \par 2/17/2021: Patient remains stable with current dose of meds, and coping well with his anxiety and psychological dependence on Xanax\par 5/3/2021: Patient continues to remain stable, coping well with the recent stressors, adhering to the prescribed medication regimen.\par 6/14/2021: Patient reports he is continuing to feel stable and feels current medication regimen is helping with anxiety and denies feeling depressed.\par 08/09/021:  Patient continues to remain stable,and adhering to rxed meds, he was able to gradually taper Xanax from 1 mg QID to 1 mg TID, but as previously anxious and worried about further reduction in dose. \par \par 09/14/2021: Patient overall remained stable but reports increase in anxiety related to work stressors which he appears to be coping effectively and feels that the current medication regimen is working better for him and he does not feel ready to further cut down Xanax at this time.\par \par 11/15/2021: Patient reports change in work assignment has helped reduce stress and states current meds helping keep his anxiety and depression sx in good control. \par \par 1/11/2022: Patient reports he is doing well, anxiety sx in good control and no depression. Patient states he feels he is ready to start reducing Xanax and would like to try on his own to reduce the dose, slowly. \par \par 2/09/2022: Patient states he tried to lower Xanax dose to 0.75 mg in AM, but could not do it with cutting Xanax 0.5 mg tab and when he lowered it to 0.5 mg he felt anxious and continued taking 1 mg TID instead. He states he however does want to try lowering Xanax and wants to try a slower taper and wants to take 0.75 mg= 0.5+0.25 mg tabs. Informed patient that when he is due for next Xanax Rx will lower the dose and send 2 weeks Rx to monitor and if tolerating will continue it for a month before next dose reduction \par \par 4/26/2022: Patient reports feeling worried lowering his afternoon dose of Xanax, however, no withdrawal sx and wants to continue to keep current dose of Xanax. \par \par 6/8/2022: Patient continues to remain stable, anxiety in good control, no depression. \par \par 7/20/2022: Patient remains stable and reports anxiety and depression sx in gfood control. Patient reports that he had tried to skip the Xanax 0.25 mg dose a couple of times a week, but he worries if he may have adverse effects, or have withdrawal like seizures and states he realizes it may be more psychological dependence that he has, and will continue to try reducing the dose slowly. \par \par 8/17/2022: Patient continues to remain stable reports no symptoms of depression and anxiety symptoms are fairly controlled with the current medication regimen, he is however reluctant to further go down on the Xanax and at this point has not felt the need or is taking more than prescribed.\par \par 12/2/2022: Patient overall continues to remain stable with no symptoms of depression and anxiety symptoms fairly good control, slight increase in anxiety stress related to work but feels that the current doses of medications both Xanax and gabapentin are helping and he has not attempted to lower the Xanax dose and would like to continue the same dose at this time.

## 2023-03-27 ENCOUNTER — APPOINTMENT (OUTPATIENT)
Dept: PSYCHIATRY | Facility: CLINIC | Age: 43
End: 2023-03-27
Payer: COMMERCIAL

## 2023-03-27 PROCEDURE — 99214 OFFICE O/P EST MOD 30 MIN: CPT

## 2023-03-27 RX ORDER — FINASTERIDE 1 MG/1
1 TABLET ORAL
Qty: 90 | Refills: 0 | Status: ACTIVE | COMMUNITY
Start: 2023-03-09

## 2023-03-27 RX ORDER — MINOXIDIL 2.5 MG/1
2.5 TABLET ORAL
Qty: 180 | Refills: 0 | Status: ACTIVE | COMMUNITY
Start: 2023-03-09

## 2023-03-28 NOTE — HISTORY OF PRESENT ILLNESS
[FreeTextEntry1] : Patient reported he is doing better at work and does not feel stressed or anxious as he did in the previous year.  Patient states he is taking Xanax 0.5 mg at 10 AM instead of 1 mg and takes 0.5 mg at 2 PM.  Patient states in the evening when he comes home he feels more "stressed".  Patient states that he starts thinking about where he is in his life and what the would have been if he was not on this medication and what he could have done etc.  Patient states he would have not been in the same job and would quit the job and find something else.  Patient states when he starts thinking about these things he gets very anxious and overwhelmed but when he takes Xanax he feels calmer and relaxed and does not think about these things.  Patient states that he is taking 0.75 mg in the evening and 1 mg at bedtime.  Patient reported that he is also taking gabapentin as prescribed, 100 mg at around 4 PM and 200 mg at bedtime.  Patient states he understands that "this medication is a crutch" and that he needs to work on reducing the medication and also not completely avoid the thoughts that increase his anxiety because those could help him move forward in his life as he would have to start thinking about how and what changes to make but if he does not think through those things because he feels anxious he would not be able to move forward.\par Patient denied feeling depressed. \par Patient denied any new medical issues or new medication changes since the last visit.  \par Patient denied any alcohol use, any substance abuse.\par

## 2023-03-28 NOTE — DISCUSSION/SUMMARY
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 He reports he is on Lexapro 10 mg/day for past 2 years and when he initially started taking Lexapro he noticed a slight reduction of anxiety. \par He wants to come off Xanax, but is afraid or withdrawal sx and worsening anxiety \par \par 12/13/19: Patient reports he is about the same as last visit, and he tried to lower Xanax dose but not successful, but after education about slow taper and using Gabapentin to help with tapering he states he does want to go ahead with tapering off Xanax, and states he feels comfortable with plan to use Gabapentin to help with taper. \par 1/8/2020: Patient did not start tapering Xanax as planned, reports he was sick and also more stressed at work, and did not want to start new med along with antibiotics.\par 2/4/2020: Patient is taking Xanax 3.5 mg/day for past 2 weeks, and taking gabapentin 100 mg BID with good effect, but a bit apprehensive to cut down Xanax dose further at this time.  \par 2/25/2020: Patient reports he feels more anxious when he does not have to work because he does not have a daily routine and also situation at home on some days make him anxious. He states he wants to lower Xanax, but when he feels stressed, even if not sever he takes Xanax to help cope with the situation. \par 3/24/2020: Patient reports he is managing anxiety and Xanax use better and has used gabapentin as needed for anxiety a few times, in addition to 4 PM and HS dose. \par He states he likes weekly Rx and would like to continue the same \par Considering he is doing well with Xanax dose reduction, plan to further taper Xanax dose. \par 6/2/2020: patient states he is doing okay, though has not taken more than rxed Xanax he states he is "struggling", i.e., eagerly waiting for his 4 PM dose; denies depression and denies panic attacks. \par 7/14/2020: Patient states he is taking Gabapentin as recommended and feels he is able to manage anxiety better and wants to continue with current regimen for another month before attempting to further taper Xanax \par 10/7/2020:Patient states he feels anxiety and depression sx are stable, wants to continue with current regimen and gradually try on his own to reduce Xanax dose to 3 mg/day. \par 11/25/2020: Patient remains stable, feels anxious, but agrees he would like to reduce Xanax dose and agrees to a trial of lowering dose. \par 1/6/2021: Patient was able to reduce Xanax dose down to 1 mg TID, feels gabapentin is helping. \par 2/17/2021: Patient remains stable with current dose of meds, and coping well with his anxiety and psychological dependence on Xanax\par 5/3/2021: Patient continues to remain stable, coping well with the recent stressors, adhering to the prescribed medication regimen.\par 6/14/2021: Patient reports he is continuing to feel stable and feels current medication regimen is helping with anxiety and denies feeling depressed.\par 08/09/021:  Patient continues to remain stable,and adhering to rxed meds, he was able to gradually taper Xanax from 1 mg QID to 1 mg TID, but as previously anxious and worried about further reduction in dose. \par \par 09/14/2021: Patient overall remained stable but reports increase in anxiety related to work stressors which he appears to be coping effectively and feels that the current medication regimen is working better for him and he does not feel ready to further cut down Xanax at this time.\par \par 11/15/2021: Patient reports change in work assignment has helped reduce stress and states current meds helping keep his anxiety and depression sx in good control. \par \par 1/11/2022: Patient reports he is doing well, anxiety sx in good control and no depression. Patient states he feels he is ready to start reducing Xanax and would like to try on his own to reduce the dose, slowly. \par \par 2/09/2022: Patient states he tried to lower Xanax dose to 0.75 mg in AM, but could not do it with cutting Xanax 0.5 mg tab and when he lowered it to 0.5 mg he felt anxious and continued taking 1 mg TID instead. He states he however does want to try lowering Xanax and wants to try a slower taper and wants to take 0.75 mg= 0.5+0.25 mg tabs. Informed patient that when he is due for next Xanax Rx will lower the dose and send 2 weeks Rx to monitor and if tolerating will continue it for a month before next dose reduction \par \par 4/26/2022: Patient reports feeling worried lowering his afternoon dose of Xanax, however, no withdrawal sx and wants to continue to keep current dose of Xanax. \par \par 6/8/2022: Patient continues to remain stable, anxiety in good control, no depression. \par \par 7/20/2022: Patient remains stable and reports anxiety and depression sx in gfood control. Patient reports that he had tried to skip the Xanax 0.25 mg dose a couple of times a week, but he worries if he may have adverse effects, or have withdrawal like seizures and states he realizes it may be more psychological dependence that he has, and will continue to try reducing the dose slowly. \par \par 8/17/2022: Patient continues to remain stable reports no symptoms of depression and anxiety symptoms are fairly controlled with the current medication regimen, he is however reluctant to further go down on the Xanax and at this point has not felt the need or is taking more than prescribed.\par \par 12/2/2022: Patient overall continues to remain stable with no symptoms of depression and anxiety symptoms fairly good control, slight increase in anxiety stress related to work but feels that the current doses of medications both Xanax and gabapentin are helping and he has not attempted to lower the Xanax dose and would like to continue the same dose at this time. \par \par 3/27/2023: Patient is taking same amount of Xanax/day, but taking less dose in the day time, and states he uses Xanax "as a crutch" to avoid feeling anxious when he is thinking about things in his life that he could change and wants to, but avoids thinking and or doing anything about it because he feels overwhelmed when these thoughts come to him when he is alone in the evening at home.

## 2023-03-28 NOTE — PHYSICAL EXAM
[None] : none [Average] : average [Cooperative] : cooperative [Euthymic] : euthymic [Full] : full [Clear] : clear [Linear/Goal Directed] : linear/goal directed [WNL] : within normal limits [None Reported] : none reported [FreeTextEntry7] : No SI/HI

## 2023-03-28 NOTE — PLAN
[Medication education provided] : Medication education provided. [Rationale for medication choices, possible risks/precautions, benefits, alternative treatment choices, and consequences of non-treatment discussed] : Rationale for medication choices, possible risks/precautions, benefits, alternative treatment choices, and consequences of non-treatment discussed with patient/family/caregiver  [FreeTextEntry5] : Psychoeducation and supportive therapy provided and recommended meds and continuing with slower taper with Xanax discussed, and also seeing therapist to help him learn to cope with his anxiety and start making behavioral changes to help him start addressing things that he wants to change in his life. \par Medication: Xanax 0.0 mg AM AM, 0.5 mg afternoon, and 0.75 mg (consider taking 0.5 mg) in the evening and 1 mg HS and Gabapentin 200 mg at 4 PM and and 100 mg 9 PM \par Continue Lexapro 20 mg/day\par Educated patient of importance of remaining abstinent from drugs and alcohol. \par Emergency procedures were discussed: pt. educated to call 911 or go to nearest ER for worsening of symptoms/suicidal/homicidal ideation. \par Refer to therapy- at the practice \par RTC in 2.5 month or earlier as needed  \par Patient expressed understanding and agreement with above plan. \par \par I stop checked today: Reference #: [id number]\par Controlled Rx in past 12 months noted per [place]-\par Rx Written	Rx Dispensed	Drug	Quantity	Days Supply	Prescriber Name	Prescriber Carolyne #	Payment Method	Dispenser\par 03/06/2023	03/11/2023	alprazolam 0.5 mg tablet	30	30	Eula Dillon	[id number]	Insurance	[facility] [id number]\par 03/06/2023	03/11/2023	alprazolam 0.25 mg tablet	30	30	Devinejeff, Eula	[id number]	Insurance	[facility] [id number]\par 03/06/2023	03/08/2023	alprazolam 1 mg tablet	60	30	DevineEula aguilar	[id number]	Insurance	[facility] [id number]\par 02/08/2023	02/09/2023	alprazolam 0.5 mg tablet	30	30	Eula Dillon	[id number]	Insurance	[facility] [id number]\par 02/08/2023	02/09/2023	alprazolam 0.25 mg tablet	30	30	Eula Dillon	[id number]	Insurance	[facility] [id number]\par 01/04/2023	01/05/2023	alprazolam 0.5 mg tablet	30	30	Eula Dillon	[id number]	Insurance	[facility] [id number]\par 01/04/2023	01/05/2023	alprazolam 0.25 mg tablet	30	30	DevineEula aguilar	[id number]	Insurance	[facility] [id number]\par 01/04/2023	01/05/2023	alprazolam 1 mg tablet	60	30	Devineni, Eula	[id number]	Insurance	[facility] [id number]\par 12/05/2022	12/05/2022	alprazolam 1 mg tablet	60	30	Devinejeff, Eula	[id number]	Insurance	[facility] [id number]\par 12/05/2022	12/05/2022	alprazolam 0.5 mg tablet	30	30	DevineEula aguilar	[id number]	Insurance	[facility] [id number]\par 12/05/2022	12/05/2022	alprazolam 0.25 mg tablet	30	30	DevineniEula	[id number]	Insurance	[facility] [id number]\par 11/02/2022	11/04/2022	alprazolam 0.25 mg tablet	30	30	DevineniEula	[id number]	Insurance	[facility] [id number]\par 11/02/2022	11/04/2022	alprazolam 0.5 mg tablet	30	30	DevineEula aguilar	[id number]	Insurance	[facility] [id number]\par 11/02/2022	11/04/2022	alprazolam 1 mg tablet	60	15	Eula Dillon	[id number]	Insurance	[facility] [id number]\par 10/04/2022	10/05/2022	alprazolam 1 mg tablet	60	30	DevineEula aguilar	[id number]	Insurance	[facility] [id number]\par 10/04/2022	10/05/2022	alprazolam 0.25 mg tablet	30	30	DevineEula aguilar	[id number]	Insurance	[facility] [id number]\par 10/04/2022	10/05/2022	alprazolam 0.5 mg tablet	30	30	DevineniEula	[id number]	Insurance	[facility] [id number]\par 08/17/2022	09/05/2022	alprazolam 0.25 mg tablet	30	30	Eula Dillon	[id number]	Insurance	[facility] [id number]\par 08/17/2022	09/05/2022	alprazolam 0.5 mg tablet	30	30	Devineni, Eula	[id number]	Insurance	[facility] [id number]\par 08/17/2022	09/05/2022	alprazolam 1 mg tablet	60	30	Devinejeff, Eula	[id number]	Insurance	[facility] [id number]\par 07/20/2022	08/07/2022	alprazolam 1 mg tablet	60	30	Devinejeff, Eula	[id number]	Insurance	[facility] [id number]\par 07/20/2022	08/07/2022	alprazolam 0.5 mg tablet	30	30	Devineni, Eula	[id number]	Insurance	[facility] [id number]\par 07/20/2022	08/07/2022	alprazolam 0.25 mg tablet	30	30	Devineni, Eula	[id number]	Insurance	[facility] [id number]\par 07/07/2022	07/10/2022	alprazolam 0.25 mg tablet	30	30	Devineni, Eula	[id number]	Insurance	[facility] [id number]\par 07/07/2022	07/10/2022	alprazolam 0.5 mg tablet	30	30	Devineni, Eula	[id number]	Insurance	[facility] [id number]\par 07/07/2022	07/10/2022	alprazolam 1 mg tablet	60	30	DevineEula aguilar	[id number]	Insurance	[facility] [id number]\par 06/08/2022	06/11/2022	alprazolam 0.5 mg tablet	30	30	DevineniEula	[id number]	Insurance	[facility] [id number]\par 06/08/2022	06/11/2022	alprazolam 1 mg tablet	60	30	Devineni, Eula	[id number]	Insurance	[facility] [id number]\par 06/08/2022	06/11/2022	alprazolam 0.25 mg tablet	30	30	Devineni, Eula	[id number]	Insurance	[facility] [id number]\par 05/13/2022	05/14/2022	alprazolam 0.5 mg tablet	30	30	Eula Dillon	[id number]	Insurance	[facility] [id number]\par 05/13/2022	05/14/2022	alprazolam 0.25 mg tablet	30	30	Eula Dillon	[id number]	Insurance	[facility] [id number]\par 05/13/2022	05/14/2022	alprazolam 1 mg tablet	60	30	Eula Dillon	[id number]	Insurance	[facility] [id number]\par 04/11/2022	04/13/2022	alprazolam 0.5 mg tablet	30	30	Eula Dillon	[id number]	Insurance	[facility] [id number]\par 04/11/2022	04/13/2022	alprazolam 1 mg tablet	60	30	Eula Dillon	[id number]	Insurance	[facility] [id number]\par 04/11/2022	04/13/2022	alprazolam 0.25 mg tablet	30	30	Eula Dillon	[id number]	Insurance	[facility] [id number]

## 2023-07-31 ENCOUNTER — RX RENEWAL (OUTPATIENT)
Age: 43
End: 2023-07-31

## 2023-08-01 ENCOUNTER — APPOINTMENT (OUTPATIENT)
Dept: PSYCHIATRY | Facility: CLINIC | Age: 43
End: 2023-08-01
Payer: COMMERCIAL

## 2023-08-01 PROCEDURE — 99214 OFFICE O/P EST MOD 30 MIN: CPT

## 2023-08-06 NOTE — DISCUSSION/SUMMARY
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 He reports he is on Lexapro 10 mg/day for past 2 years and when he initially started taking Lexapro he noticed a slight reduction of anxiety.  He wants to come off Xanax, but is afraid or withdrawal sx and worsening anxiety   12/13/19: Patient reports he is about the same as last visit, and he tried to lower Xanax dose but not successful, but after education about slow taper and using Gabapentin to help with tapering he states he does want to go ahead with tapering off Xanax, and states he feels comfortable with plan to use Gabapentin to help with taper.  1/8/2020: Patient did not start tapering Xanax as planned, reports he was sick and also more stressed at work, and did not want to start new med along with antibiotics. 2/4/2020: Patient is taking Xanax 3.5 mg/day for past 2 weeks, and taking gabapentin 100 mg BID with good effect, but a bit apprehensive to cut down Xanax dose further at this time.   2/25/2020: Patient reports he feels more anxious when he does not have to work because he does not have a daily routine and also situation at home on some days make him anxious. He states he wants to lower Xanax, but when he feels stressed, even if not sever he takes Xanax to help cope with the situation.  3/24/2020: Patient reports he is managing anxiety and Xanax use better and has used gabapentin as needed for anxiety a few times, in addition to 4 PM and HS dose.  He states he likes weekly Rx and would like to continue the same  Considering he is doing well with Xanax dose reduction, plan to further taper Xanax dose.  6/2/2020: patient states he is doing okay, though has not taken more than rxed Xanax he states he is "struggling", i.e., eagerly waiting for his 4 PM dose; denies depression and denies panic attacks.  7/14/2020: Patient states he is taking Gabapentin as recommended and feels he is able to manage anxiety better and wants to continue with current regimen for another month before attempting to further taper Xanax  10/7/2020:Patient states he feels anxiety and depression sx are stable, wants to continue with current regimen and gradually try on his own to reduce Xanax dose to 3 mg/day.  11/25/2020: Patient remains stable, feels anxious, but agrees he would like to reduce Xanax dose and agrees to a trial of lowering dose.  1/6/2021: Patient was able to reduce Xanax dose down to 1 mg TID, feels gabapentin is helping.  2/17/2021: Patient remains stable with current dose of meds, and coping well with his anxiety and psychological dependence on Xanax 5/3/2021: Patient continues to remain stable, coping well with the recent stressors, adhering to the prescribed medication regimen. 6/14/2021: Patient reports he is continuing to feel stable and feels current medication regimen is helping with anxiety and denies feeling depressed. 08/09/021:  Patient continues to remain stable,and adhering to rxed meds, he was able to gradually taper Xanax from 1 mg QID to 1 mg TID, but as previously anxious and worried about further reduction in dose.   09/14/2021: Patient overall remained stable but reports increase in anxiety related to work stressors which he appears to be coping effectively and feels that the current medication regimen is working better for him and he does not feel ready to further cut down Xanax at this time.  11/15/2021: Patient reports change in work assignment has helped reduce stress and states current meds helping keep his anxiety and depression sx in good control.   1/11/2022: Patient reports he is doing well, anxiety sx in good control and no depression. Patient states he feels he is ready to start reducing Xanax and would like to try on his own to reduce the dose, slowly.   2/09/2022: Patient states he tried to lower Xanax dose to 0.75 mg in AM, but could not do it with cutting Xanax 0.5 mg tab and when he lowered it to 0.5 mg he felt anxious and continued taking 1 mg TID instead. He states he however does want to try lowering Xanax and wants to try a slower taper and wants to take 0.75 mg= 0.5+0.25 mg tabs. Informed patient that when he is due for next Xanax Rx will lower the dose and send 2 weeks Rx to monitor and if tolerating will continue it for a month before next dose reduction   4/26/2022: Patient reports feeling worried lowering his afternoon dose of Xanax, however, no withdrawal sx and wants to continue to keep current dose of Xanax.   6/8/2022: Patient continues to remain stable, anxiety in good control, no depression.   7/20/2022: Patient remains stable and reports anxiety and depression sx in gfood control. Patient reports that he had tried to skip the Xanax 0.25 mg dose a couple of times a week, but he worries if he may have adverse effects, or have withdrawal like seizures and states he realizes it may be more psychological dependence that he has, and will continue to try reducing the dose slowly.   8/17/2022: Patient continues to remain stable reports no symptoms of depression and anxiety symptoms are fairly controlled with the current medication regimen, he is however reluctant to further go down on the Xanax and at this point has not felt the need or is taking more than prescribed.  12/2/2022: Patient overall continues to remain stable with no symptoms of depression and anxiety symptoms fairly good control, slight increase in anxiety stress related to work but feels that the current doses of medications both Xanax and gabapentin are helping and he has not attempted to lower the Xanax dose and would like to continue the same dose at this time.   3/27/2023: Patient is taking same amount of Xanax/day, but taking less dose in the day time, and states he uses Xanax "as a crutch" to avoid feeling anxious when he is thinking about things in his life that he could change and wants to, but avoids thinking and or doing anything about it because he feels overwhelmed when these thoughts come to him when he is alone in the evening at home.   8/1/2023: Paient continues to remains stable, and states some days he is feeling a little more tried after taking AM Xanax, but is concerned about lowering the dose overall, but agrees to [facility] dosing schedule to take lower dose in AM and monitor if he could consistently lower the dose.

## 2023-08-06 NOTE — HISTORY OF PRESENT ILLNESS
[FreeTextEntry1] : Patient reported he is doing good since last visit. States he is not working this summer. 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 Sister and her family-  and two kids ages 3 and 2.5 yeras are moving in with and they are making additions to the house to accomodate this change. He states he does like having his sister and nephews, and states though he thought it might be stresssful with more people in the house he feels otherwise. He states some days he is feeling a little more tried after taking AM Xanax, but is concerned about lowering the dose overall, but agrees to [facility] dosing schedule to take lower dose in AM and monitor if he could consistently lower the dose.  Patient denied feeling depressed. He denied panic attacks.  Patient denied any new medical issues or new medication changes since the last visit.   Patient denied any alcohol use, any substance abuse.

## 2023-08-06 NOTE — PHYSICAL EXAM
[None] : none [Average] : average [Cooperative] : cooperative [Euthymic] : euthymic [Full] : full [Clear] : clear [None Reported] : none reported [Linear/Goal Directed] : linear/goal directed [WNL] : within normal limits [FreeTextEntry7] : No SI/HI

## 2023-08-06 NOTE — PLAN
[Medication education provided] : Medication education provided. [Rationale for medication choices, possible risks/precautions, benefits, alternative treatment choices, and consequences of non-treatment discussed] : Rationale for medication choices, possible risks/precautions, benefits, alternative treatment choices, and consequences of non-treatment discussed with patient/family/caregiver  [FreeTextEntry5] : Psychoeducation and supportive therapy provided and recommended meds and continuing with slower taper with Xanax discussed, and also seeing therapist to help him learn to cope with his anxiety and start making behavioral changes to help him start addressing things that he wants to change in his life.  Medication: Advise patient consider taking Xanax 0.75 mg AM and 0.5 to 1 mg in afternoon, 1 mg HS, continue Gabapentin 200 mg at 4 PM and and 100 mg 9 PM and Lexapro 20 mg/day Educated patient of importance of remaining abstinent from drugs and alcohol.  Emergency procedures were discussed: pt. educated to call 911 or go to nearest ER for worsening of symptoms/suicidal/homicidal ideation.  RTC in 2.5 month or earlier as needed   Patient expressed understanding and agreement with above plan.   I stop checked today: Reference #: [id number]  Practitioner Count: 1 Pharmacy Count: 1 Current Opioid Prescriptions: 0 Current Benzodiazepine Prescriptions: 3 Current Stimulant Prescriptions: 0  Patient Demographic Information (PDI) PDI	First Name	Last Name	Birth Date	Gender	Street Address	City	State	Zip Code [place]	1980	Male	[address] [facility]	[address]  Prescription Information PDI	My Rx	Current Rx	Drug Type	Rx Written	Rx Dispensed	Drug	Quantity	Days Supply	Prescriber Name	Prescriber BRITTANY #	Payment Method	Dispenser A	Y	Y	B	06/27/2023	07/06/2023	alprazolam 0.5 mg tablet	30	30	Eula Dillon	[id number]	Insurance	[facility] [id number] A	Y	Y	B	06/27/2023	07/06/2023	alprazolam 1 mg tablet	60	30	Eula Dillon	[id number]	Insurance	[facility] [id number] A	Y	Y	B	06/27/2023	07/06/2023	alprazolam 0.25 mg tablet	30	30	Eula Dillon	[id number]	Insurance	[facility] [id number] A	Y	N	B	06/07/2023	06/07/2023	alprazolam 0.5 mg tablet	30	30	Eula Dillon	[id number]	Insurance	[facility] [id number] A	Y	N	B	06/07/2023	06/07/2023	alprazolam 1 mg tablet	60	30	Eula Dillon	[id number]	Insurance	[facility] [id number] A	Y	N	B	06/07/2023	06/07/2023	alprazolam 0.25 mg tablet	30	30	Devineni, Eula	[id number]	Insurance	[facility] [id number] A	Y	N	B	05/05/2023	05/07/2023	alprazolam 1 mg tablet	60	30	Devineni, Eula	[id number]	Insurance	[facility] [id number] A	Y	N	B	05/05/2023	05/07/2023	alprazolam 0.5 mg tablet	30	30	Devineni, Eula	[id number]	Insurance	[facility] [id number] A	Y	N	B	05/05/2023	05/07/2023	alprazolam 0.25 mg tablet	30	30	Devineni, Eula	[id number]	Insurance	[facility] [id number] A	Y	N	B	03/27/2023	04/08/2023	alprazolam 0.25 mg tablet	30	30	Devineni, Eula	[id number]	Insurance	[facility] [id number] A	Y	N	B	03/27/2023	04/08/2023	alprazolam 0.5 mg tablet	30	30	Devineni, Eula	[id number]	Insurance	[facility] [id number] A	Y	N	B	03/27/2023	04/08/2023	alprazolam 1 mg tablet	60	30	Devineni, Eula	[id number]	Insurance	[facility] [id number] A	Y	N	B	03/06/2023	03/11/2023	alprazolam 0.5 mg tablet	30	30	Devineni, Eula	[id number]	Insurance	[facility] [id number] A	Y	N	B	03/06/2023	03/11/2023	alprazolam 0.25 mg tablet	30	30	Devineni, Eula	[id number]	Insurance	[facility] [id number] A	Y	N	B	03/06/2023	03/08/2023	alprazolam 1 mg tablet	60	30	Devineni, Eula	[id number]	Insurance	[facility] [id number] A	Y	N	B	02/08/2023	02/09/2023	alprazolam 0.5 mg tablet	30	30	Devineni, Eula	[id number]	Insurance	[facility] [id number] A	Y	N	B	02/08/2023	02/09/2023	alprazolam 0.25 mg tablet	30	30	Devineni, Eula	[id number]	Insurance	[facility] [id number] A	N	N	B	02/03/2023	02/05/2023	alprazolam 1 mg tablet	60	30	Henok Menard MD	[id number]	Insurance	[facility] [id number] A	Y	N	B	01/04/2023	01/05/2023	alprazolam 0.5 mg tablet	30	30	Devineni, Eula	[id number]	Insurance	[facility] [id number] A	Y	N	B	01/04/2023	01/05/2023	alprazolam 0.25 mg tablet	30	30	Devineni, Eula	[id number]	Insurance	[facility] [id number] A	Y	N	B	01/04/2023	01/05/2023	alprazolam 1 mg tablet	60	30	Devineni, Eula	[id number]	Insurance	[facility] [id number] A	Y	N	B	12/05/2022	12/05/2022	alprazolam 1 mg tablet	60	30	Devineni, Eula	[id number]	Insurance	[facility] [id number] A	Y	N	B	12/05/2022	12/05/2022	alprazolam 0.5 mg tablet	30	30	Devineni, Eula	[id number]	Insurance	[facility] [id number] A	Y	N	B	12/05/2022	12/05/2022	alprazolam 0.25 mg tablet	30	30	Devineni, Eula	[id number]	Insurance	[facility] [id number] A	Y	N	B	11/02/2022	11/04/2022	alprazolam 0.25 mg tablet	30	30	Devineni, Eula	[id number]	Insurance	[facility] [id number] A	Y	N	B	11/02/2022	11/04/2022	alprazolam 0.5 mg tablet	30	30	Devineni, Eula	[id number]	Insurance	[facility] [id number] A	Y	N	B	11/02/2022	11/04/2022	alprazolam 1 mg tablet	60	15	Devineni, Eula	[id number]	Insurance	[facility] [id number] A	Y	N	B	10/04/2022	10/05/2022	alprazolam 1 mg tablet	60	30	Devineni, Eula	[id number]	Insurance	[facility] [id number] A	Y	N	B	10/04/2022	10/05/2022	alprazolam 0.25 mg tablet	30	30	Devineni, Eula	[id number]	Insurance	[facility] [id number] A	Y	N	B	10/04/2022	10/05/2022	alprazolam 0.5 mg tablet	30	30	Devineni, Eula	[id number]	Insurance	[facility] [id number] A	Y	N	B	08/17/2022	09/05/2022	alprazolam 0.25 mg tablet	30	30	Devineni, Eula	[id number]	Insurance	[facility] [id number] A	Y	N	B	08/17/2022	09/05/2022	alprazolam 0.5 mg tablet	30	30	Devineni, Eula	[id number]	Insurance	[facility] [id number] A	Y	N	B	08/17/2022	09/05/2022	alprazolam 1 mg tablet	60	30	Devinejeff, Eula	[id number]	Insurance	[facility] [id number] A	Y	N	B	07/20/2022	08/07/2022	alprazolam 1 mg tablet	60	30	Devineni, Eula	[id number]	Insurance	[facility] [id number] A	Y	N	B	07/20/2022	08/07/2022	alprazolam 0.5 mg tablet	30	30	Devineni, Eula	[id number]	Insurance	[facility] [id number] A	Y	N	B	07/20/2022	08/07/2022	alprazolam 0.25 mg tablet	30	30	Devineni, Eula	[id number]	Insurance	[facility] [id number]

## 2023-10-23 ENCOUNTER — APPOINTMENT (OUTPATIENT)
Dept: PSYCHIATRY | Facility: CLINIC | Age: 43
End: 2023-10-23
Payer: COMMERCIAL

## 2023-10-23 PROCEDURE — 99214 OFFICE O/P EST MOD 30 MIN: CPT

## 2024-01-30 ENCOUNTER — APPOINTMENT (OUTPATIENT)
Dept: PSYCHIATRY | Facility: CLINIC | Age: 44
End: 2024-01-30
Payer: COMMERCIAL

## 2024-01-30 PROCEDURE — 99214 OFFICE O/P EST MOD 30 MIN: CPT

## 2024-02-28 NOTE — HISTORY OF PRESENT ILLNESS
[FreeTextEntry1] : Patient states he is doing okay since last visit. 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  Patient denied feeling depressed. He denied panic attacks.  Patient denied any new medical issues or new medication changes since the last visit.   Patient denied any alcohol use, any substance abuse.

## 2024-02-28 NOTE — PLAN
[FreeTextEntry5] : Psychoeducation and supportive therapy provided and recommended meds and continuing with slower taper with Xanax discussed, and also seeing therapist to help him learn to cope with his anxiety and start making behavioral changes to help him start addressing things that he wants to change in his life.  Medication: Xanax 1 mg BID, and 0.5 mg in afternoon, 0.25 mg PM  continue Gabapentin 200 mg at 4 PM and and 100 mg 9 PM and Lexapro 20 mg/day Educated patient of importance of remaining abstinent from drugs and alcohol.  Emergency procedures were discussed: pt. educated to call 911 or go to nearest ER for worsening of symptoms/suicidal/homicidal ideation.  RTC in 2.5 month or earlier as needed.  Patient expressed understanding and agreement with above plan.   I stop checked today: Reference #: [id number]  Practitioner Count: 1 Pharmacy Count: 2 Current Opioid Prescriptions: 0 Current Benzodiazepine Prescriptions: 3 Current Stimulant Prescriptions: 0   Patient Demographic Information (PDI)     PDI	First Name	Last Name	Birth Date	Gender	Street Address	[facility]	Zip Code [place]	1980	Male	[address] [facility]	[address]  Prescription Information    PDI Filter:   PDI	My Rx	Current Rx	Drug Type	Rx Written	Rx Dispensed	Drug	Quantity	Days Supply	Prescriber Name	Prescriber BRITTANY #	Payment Method	Dispenser A	Y	Y	B	01/02/2024	01/05/2024	alprazolam 0.25 mg tablet	30	30	Santana, Eula	[id number]	Insurance	[id number] A	Y	Y	B	01/02/2024	01/05/2024	alprazolam 1 mg tablet	60	30	Devineni, Eula	[id number]	Insurance	[id number] A	Y	Y	B	01/02/2024	01/02/2024	alprazolam 0.5 mg tablet	30	30	Devineni, Eula	[id number]	Insurance	[id number] A	Y	N	B	11/28/2023	12/06/2023	alprazolam 1 mg tablet	60	30	Santana, Eula	[id number]	Insurance	[facility] [id number] A	Y	N	B	11/28/2023	12/06/2023	alprazolam 0.25 mg tablet	30	30	Devinejeff, Eula	[id number]	Insurance	[facility] [id number] A	Y	N	B	11/28/2023	11/29/2023	alprazolam 0.5 mg tablet	30	30	Devineni, Eula	[id number]	Insurance	[facility] [id number] A	Y	N	B	10/23/2023	11/08/2023	alprazolam 1 mg tablet	60	30	Devineni, Eula	[id number]	Insurance	[facility] [id number] A	Y	N	B	10/23/2023	11/08/2023	alprazolam 0.25 mg tablet	30	30	Devineni, Eula	[id number]	Insurance	[facility] [id number] A	Y	N	B	10/05/2023	10/05/2023	alprazolam 0.25 mg tablet	30	30	Devineni, Eula	[id number]	Insurance	[facility] [id number] A	Y	N	B	10/05/2023	10/05/2023	alprazolam 1 mg tablet	60	30	Devineni, Eula	[id number]	Insurance	[facility] [id number] A	Y	N	B	09/05/2023	09/06/2023	alprazolam 1 mg tablet	60	30	Devineni, Eula	[id number]	Insurance	[facility] [id number] A	Y	N	B	09/05/2023	09/06/2023	alprazolam 0.5 mg tablet	30	30	Devineni, Eula	[id number]	Insurance	[facility] [id number] A	Y	N	B	09/05/2023	09/06/2023	alprazolam 0.25 mg tablet	30	30	Devineni, Eula	[id number]	Insurance	[facility] [id number] A	Y	N	B	08/01/2023	08/04/2023	alprazolam 1 mg tablet	60	30	Devineni, Eula	[id number]	Insurance	[facility] [id number] A	Y	N	B	08/01/2023	08/04/2023	alprazolam 0.5 mg tablet	30	30	Devineni, Eula	[id number]	Insurance	[facility] [id number] A	Y	N	B	08/01/2023	08/04/2023	alprazolam 0.25 mg tablet	30	30	Devineni, Eula	[id number]	Insurance	[facility] [id number] A	Y	N	B	06/27/2023	07/06/2023	alprazolam 0.5 mg tablet	30	30	Devineni, Eula	[id number]	Insurance	[facility] [id number] A	Y	N	B	06/27/2023	07/06/2023	alprazolam 1 mg tablet	60	30	Devineni, Eula	[id number]	Insurance	[facility] [id number] A	Y	N	B	06/27/2023	07/06/2023	alprazolam 0.25 mg tablet	30	30	Devineni, Eula	[id number]	Insurance	[facility] [id number] A	Y	N	B	06/07/2023	06/07/2023	alprazolam 0.5 mg tablet	30	30	Devineni, Eula	[id number]	Insurance	[facility] [id number] A	Y	N	B	06/07/2023	06/07/2023	alprazolam 1 mg tablet	60	30	Devineni, Eula	[id number]	Insurance	[facility] [id number] A	Y	N	B	06/07/2023	06/07/2023	alprazolam 0.25 mg tablet	30	30	Devineni, Eula	[id number]	Insurance	[facility] [id number] A	Y	N	B	05/05/2023	05/07/2023	alprazolam 1 mg tablet	60	30	Devineni, Eula	[id number]	Insurance	[facility] [id number] A	Y	N	B	05/05/2023	05/07/2023	alprazolam 0.5 mg tablet	30	30	Devineni, Eula	[id number]	Insurance	[facility] [id number] A	Y	N	B	05/05/2023	05/07/2023	alprazolam 0.25 mg tablet	30	30	Devineni, Eula	[id number]	Insurance	[facility] [id number] A	Y	N	B	03/27/2023	04/08/2023	alprazolam 0.25 mg tablet	30	30	Devineni, Eula	[id number]	Insurance	[facility] [id number] A	Y	N	B	03/27/2023	04/08/2023	alprazolam 0.5 mg tablet	30	30	Devineni, Eula	[id number]	Insurance	[facility] [id number] A	Y	N	B	03/27/2023	04/08/2023	alprazolam 1 mg tablet	60	30	Devineni, Eula	[id number]	Insurance	[facility] [id number] A	Y	N	B	03/06/2023	03/11/2023	alprazolam 0.5 mg tablet	30	30	Eula Dillon	[id number]	Insurance	[facility] [id number] A	Y	N	B	03/06/2023	03/11/2023	alprazolam 0.25 mg tablet	30	30	Eula Dillon	[id number]	Insurance	[facility] [id number] A	Y	N	B	03/06/2023	03/08/2023	alprazolam 1 mg tablet	60	30	Eula Dillon	[id number]	Insurance	[facility] [id number] A	Y	N	B	02/08/2023	02/09/2023	alprazolam 0.5 mg tablet	30	30	Eula Dillon	[id number]	Insurance	[facility] [id number] A	Y	N	B	02/08/2023	02/09/2023	alprazolam 0.25 mg tablet	30	30	Eula Dillon	[id number]	Insurance	[facility] [id number] A	N	N	B	02/03/2023	02/05/2023	alprazolam 1 mg tablet	60	30	Henok Menard MD	[id number]	Insurance	[facility] [id number]

## 2024-02-28 NOTE — DISCUSSION/SUMMARY
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 He reports he is on Lexapro 10 mg/day for past 2 years and when he initially started taking Lexapro he noticed a slight reduction of anxiety.  He wants to come off Xanax, but is afraid or withdrawal sx and worsening anxiety   12/13/19: Patient reports he is about the same as last visit, and he tried to lower Xanax dose but not successful, but after education about slow taper and using Gabapentin to help with tapering he states he does want to go ahead with tapering off Xanax, and states he feels comfortable with plan to use Gabapentin to help with taper.  1/8/2020: Patient did not start tapering Xanax as planned, reports he was sick and also more stressed at work, and did not want to start new med along with antibiotics. 2/4/2020: Patient is taking Xanax 3.5 mg/day for past 2 weeks, and taking gabapentin 100 mg BID with good effect, but a bit apprehensive to cut down Xanax dose further at this time.   2/25/2020: Patient reports he feels more anxious when he does not have to work because he does not have a daily routine and also situation at home on some days make him anxious. He states he wants to lower Xanax, but when he feels stressed, even if not sever he takes Xanax to help cope with the situation.  3/24/2020: Patient reports he is managing anxiety and Xanax use better and has used gabapentin as needed for anxiety a few times, in addition to 4 PM and HS dose.  He states he likes weekly Rx and would like to continue the same  Considering he is doing well with Xanax dose reduction, plan to further taper Xanax dose.  6/2/2020: patient states he is doing okay, though has not taken more than rxed Xanax he states he is "struggling", i.e., eagerly waiting for his 4 PM dose; denies depression and denies panic attacks.  7/14/2020: Patient states he is taking Gabapentin as recommended and feels he is able to manage anxiety better and wants to continue with current regimen for another month before attempting to further taper Xanax  10/7/2020:Patient states he feels anxiety and depression sx are stable, wants to continue with current regimen and gradually try on his own to reduce Xanax dose to 3 mg/day.  11/25/2020: Patient remains stable, feels anxious, but agrees he would like to reduce Xanax dose and agrees to a trial of lowering dose.  1/6/2021: Patient was able to reduce Xanax dose down to 1 mg TID, feels gabapentin is helping.  2/17/2021: Patient remains stable with current dose of meds, and coping well with his anxiety and psychological dependence on Xanax 5/3/2021: Patient continues to remain stable, coping well with the recent stressors, adhering to the prescribed medication regimen. 6/14/2021: Patient reports he is continuing to feel stable and feels current medication regimen is helping with anxiety and denies feeling depressed. 08/09/021:  Patient continues to remain stable,and adhering to rxed meds, he was able to gradually taper Xanax from 1 mg QID to 1 mg TID, but as previously anxious and worried about further reduction in dose.   09/14/2021: Patient overall remained stable but reports increase in anxiety related to work stressors which he appears to be coping effectively and feels that the current medication regimen is working better for him and he does not feel ready to further cut down Xanax at this time.  11/15/2021: Patient reports change in work assignment has helped reduce stress and states current meds helping keep his anxiety and depression sx in good control.   1/11/2022: Patient reports he is doing well, anxiety sx in good control and no depression. Patient states he feels he is ready to start reducing Xanax and would like to try on his own to reduce the dose, slowly.   2/09/2022: Patient states he tried to lower Xanax dose to 0.75 mg in AM, but could not do it with cutting Xanax 0.5 mg tab and when he lowered it to 0.5 mg he felt anxious and continued taking 1 mg TID instead. He states he however does want to try lowering Xanax and wants to try a slower taper and wants to take 0.75 mg= 0.5+0.25 mg tabs. Informed patient that when he is due for next Xanax Rx will lower the dose and send 2 weeks Rx to monitor and if tolerating will continue it for a month before next dose reduction   4/26/2022: Patient reports feeling worried lowering his afternoon dose of Xanax, however, no withdrawal sx and wants to continue to keep current dose of Xanax.   6/8/2022: Patient continues to remain stable, anxiety in good control, no depression.   7/20/2022: Patient remains stable and reports anxiety and depression sx in gfood control. Patient reports that he had tried to skip the Xanax 0.25 mg dose a couple of times a week, but he worries if he may have adverse effects, or have withdrawal like seizures and states he realizes it may be more psychological dependence that he has, and will continue to try reducing the dose slowly.   8/17/2022: Patient continues to remain stable reports no symptoms of depression and anxiety symptoms are fairly controlled with the current medication regimen, he is however reluctant to further go down on the Xanax and at this point has not felt the need or is taking more than prescribed.  12/2/2022: Patient overall continues to remain stable with no symptoms of depression and anxiety symptoms fairly good control, slight increase in anxiety stress related to work but feels that the current doses of medications both Xanax and gabapentin are helping and he has not attempted to lower the Xanax dose and would like to continue the same dose at this time.   3/27/2023: Patient is taking same amount of Xanax/day, but taking less dose in the day time, and states he uses Xanax "as a crutch" to avoid feeling anxious when he is thinking about things in his life that he could change and wants to, but avoids thinking and or doing anything about it because he feels overwhelmed when these thoughts come to him when he is alone in the evening at home.   8/1/2023: Paient continues to remains stable, and states some days he is feeling a little more tried after taking AM Xanax, but is concerned about lowering the dose overall, but agrees to [facility] dosing schedule to take lower dose in AM and monitor if he could consistently lower the dose.   10/23/2023: Patient reports increased stress at work and had a panic attack at work hopes get help from work.    12/11/2023: Patient is doing better, able to cope with stress better, and no panic attacks since last visit.

## 2024-03-26 ENCOUNTER — APPOINTMENT (OUTPATIENT)
Dept: PSYCHIATRY | Facility: CLINIC | Age: 44
End: 2024-03-26
Payer: COMMERCIAL

## 2024-03-26 PROCEDURE — 99214 OFFICE O/P EST MOD 30 MIN: CPT

## 2024-03-26 PROCEDURE — G2211 COMPLEX E/M VISIT ADD ON: CPT

## 2024-03-29 NOTE — HISTORY OF PRESENT ILLNESS
[FreeTextEntry1] : Patient reported that since last visit one of his close causes will he go up to 30 considers him as his brother passed away in his sleep.  Patient states that they still do not know a lot of details of what happened and how he was doing before he passed away.  Patient reported that he has been thinking about this a lot and "freaking out about sleep".  Patient reported that he has been experiencing increasing anxiety and panic attacks in the recently he felt "faint" while he was at work.  Patient reported that he also completed a sleep study and that showed that his oxygen level drops to 72% at night.  Patient also reports that they noticed episodes of hypercapnia and hypercapnia and he was getting waited to be fitted for the new CPAP machine. Patient reported that he is still continuing to take the same dose of Xanax and only taking gabapentin 200 mg daily and takes 100 mg at bedtime sometimes. Patient denied any alcohol use, any substance abuse.

## 2024-03-29 NOTE — PHYSICAL EXAM
[None] : none [Average] : average [Cooperative] : cooperative [Euthymic] : euthymic [Full] : full [Clear] : clear [Linear/Goal Directed] : linear/goal directed [None Reported] : none reported [WNL] : within normal limits [Anxious] : anxious [de-identified] : slightly constricted [FreeTextEntry7] : No SI/HI

## 2024-03-29 NOTE — DISCUSSION/SUMMARY
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 He reports he is on Lexapro 10 mg/day for past 2 years and when he initially started taking Lexapro he noticed a slight reduction of anxiety.  He wants to come off Xanax, but is afraid or withdrawal sx and worsening anxiety   12/13/19: Patient reports he is about the same as last visit, and he tried to lower Xanax dose but not successful, but after education about slow taper and using Gabapentin to help with tapering he states he does want to go ahead with tapering off Xanax, and states he feels comfortable with plan to use Gabapentin to help with taper.  1/8/2020: Patient did not start tapering Xanax as planned, reports he was sick and also more stressed at work, and did not want to start new med along with antibiotics. 2/4/2020: Patient is taking Xanax 3.5 mg/day for past 2 weeks, and taking gabapentin 100 mg BID with good effect, but a bit apprehensive to cut down Xanax dose further at this time.   2/25/2020: Patient reports he feels more anxious when he does not have to work because he does not have a daily routine and also situation at home on some days make him anxious. He states he wants to lower Xanax, but when he feels stressed, even if not sever he takes Xanax to help cope with the situation.  3/24/2020: Patient reports he is managing anxiety and Xanax use better and has used gabapentin as needed for anxiety a few times, in addition to 4 PM and HS dose.  He states he likes weekly Rx and would like to continue the same  Considering he is doing well with Xanax dose reduction, plan to further taper Xanax dose.  6/2/2020: patient states he is doing okay, though has not taken more than rxed Xanax he states he is "struggling", i.e., eagerly waiting for his 4 PM dose; denies depression and denies panic attacks.  7/14/2020: Patient states he is taking Gabapentin as recommended and feels he is able to manage anxiety better and wants to continue with current regimen for another month before attempting to further taper Xanax  10/7/2020:Patient states he feels anxiety and depression sx are stable, wants to continue with current regimen and gradually try on his own to reduce Xanax dose to 3 mg/day.  11/25/2020: Patient remains stable, feels anxious, but agrees he would like to reduce Xanax dose and agrees to a trial of lowering dose.  1/6/2021: Patient was able to reduce Xanax dose down to 1 mg TID, feels gabapentin is helping.  2/17/2021: Patient remains stable with current dose of meds, and coping well with his anxiety and psychological dependence on Xanax 5/3/2021: Patient continues to remain stable, coping well with the recent stressors, adhering to the prescribed medication regimen. 6/14/2021: Patient reports he is continuing to feel stable and feels current medication regimen is helping with anxiety and denies feeling depressed. 08/09/021:  Patient continues to remain stable,and adhering to rxed meds, he was able to gradually taper Xanax from 1 mg QID to 1 mg TID, but as previously anxious and worried about further reduction in dose.   09/14/2021: Patient overall remained stable but reports increase in anxiety related to work stressors which he appears to be coping effectively and feels that the current medication regimen is working better for him and he does not feel ready to further cut down Xanax at this time.  11/15/2021: Patient reports change in work assignment has helped reduce stress and states current meds helping keep his anxiety and depression sx in good control.   1/11/2022: Patient reports he is doing well, anxiety sx in good control and no depression. Patient states he feels he is ready to start reducing Xanax and would like to try on his own to reduce the dose, slowly.   2/09/2022: Patient states he tried to lower Xanax dose to 0.75 mg in AM, but could not do it with cutting Xanax 0.5 mg tab and when he lowered it to 0.5 mg he felt anxious and continued taking 1 mg TID instead. He states he however does want to try lowering Xanax and wants to try a slower taper and wants to take 0.75 mg= 0.5+0.25 mg tabs. Informed patient that when he is due for next Xanax Rx will lower the dose and send 2 weeks Rx to monitor and if tolerating will continue it for a month before next dose reduction   4/26/2022: Patient reports feeling worried lowering his afternoon dose of Xanax, however, no withdrawal sx and wants to continue to keep current dose of Xanax.   6/8/2022: Patient continues to remain stable, anxiety in good control, no depression.   7/20/2022: Patient remains stable and reports anxiety and depression sx in gfood control. Patient reports that he had tried to skip the Xanax 0.25 mg dose a couple of times a week, but he worries if he may have adverse effects, or have withdrawal like seizures and states he realizes it may be more psychological dependence that he has, and will continue to try reducing the dose slowly.   8/17/2022: Patient continues to remain stable reports no symptoms of depression and anxiety symptoms are fairly controlled with the current medication regimen, he is however reluctant to further go down on the Xanax and at this point has not felt the need or is taking more than prescribed.  12/2/2022: Patient overall continues to remain stable with no symptoms of depression and anxiety symptoms fairly good control, slight increase in anxiety stress related to work but feels that the current doses of medications both Xanax and gabapentin are helping and he has not attempted to lower the Xanax dose and would like to continue the same dose at this time.   3/27/2023: Patient is taking same amount of Xanax/day, but taking less dose in the day time, and states he uses Xanax "as a crutch" to avoid feeling anxious when he is thinking about things in his life that he could change and wants to, but avoids thinking and or doing anything about it because he feels overwhelmed when these thoughts come to him when he is alone in the evening at home.   8/1/2023: Paient continues to remains stable, and states some days he is feeling a little more tried after taking AM Xanax, but is concerned about lowering the dose overall, but agrees to [facility] dosing schedule to take lower dose in AM and monitor if he could consistently lower the dose.   10/23/2023: Patient reports increased stress at work and had a panic attack at work hopes get help from work.    12/11/2023: Patient is doing better, able to cope with stress better, and no panic attacks since last visit.   3/26/2024: Patient reported an increase in anxiety symptoms and also experiencing panic attacks after recent death in the family trip triggered an increase in anxiety symptoms and would beenfit from increasing gabapentin to help with anxiety

## 2024-03-29 NOTE — PLAN
[Rationale for medication choices, possible risks/precautions, benefits, alternative treatment choices, and consequences of non-treatment discussed] : Rationale for medication choices, possible risks/precautions, benefits, alternative treatment choices, and consequences of non-treatment discussed with patient/family/caregiver  [Medication education provided] : Medication education provided. [FreeTextEntry5] : Psychoeducation and supportive therapy provided and recommended med changes and also provided patient information for bereavement support groups.  Medication: Xanax 1 mg BID, and 0.5 mg in afternoon, 0.25 mg PM  continue Gabapentin 200 mg at 4 PM and 100-200 mg 9 PM and Lexapro 20 mg/day Educated patient of importance of remaining abstinent from drugs and alcohol.  Emergency procedures were discussed: pt. educated to call 911 or go to nearest ER for worsening of symptoms/suicidal/homicidal ideation.  RTC in 4-6 weeks or earlier as needed.  Patient expressed understanding and agreement with above plan.   I stop checked today: Reference #: [id number]  Practitioner Count: 1 Pharmacy Count: 1 Current Opioid Prescriptions: 0 Current Benzodiazepine Prescriptions: 3 Current Stimulant Prescriptions: 0   Patient Demographic Information (PDI)     PDI	First Name	Last Name	Birth Date	Gender	Street Address	City	State	Zip Code KAMALJIT Watters	1980	Male	[address]	[facility]	[id number]  Prescription Information    PDI Filter:   PDI	My Rx	Current Rx	Drug Type	Rx Written	Rx Dispensed	Drug	Quantity	Days Supply	Prescriber Name	Prescriber BRITTANY #	Payment Method	Dispenser A	Y	Y	B	02/28/2024	03/04/2024	alprazolam 0.5 mg tablet	30	30	Devineni, Eula	[id number]	Insurance	[id number] A	Y	Y	B	02/28/2024	03/04/2024	alprazolam 0.25 mg tablet	30	30	Devineni, Eula	[id number]	[id number] A	Y	Y	B	02/28/2024	03/04/2024	alprazolam 1 mg tablet	60	30	Devineni, Eula	[id number]	Insurance	[id number] A	Y	N	B	02/02/2024	02/03/2024	alprazolam 0.25 mg tablet	30	30	Devineni, Eula	[id number]	Insurance	[id number] A	Y	N	B	02/02/2024	02/03/2024	alprazolam 0.5 mg tablet	30	30	Devineni, Eula	[id number]	Insurance	[id number] A	Y	N	B	02/02/2024	02/03/2024	alprazolam 1 mg tablet	60	30	Devineni, Eula	[id number]	Insurance	[id number] A	Y	N	B	01/02/2024	01/05/2024	alprazolam 0.25 mg tablet	30	30	Devineni, Eula	[id number]	Insurance	[id number] A	Y	N	B	01/02/2024	01/05/2024	alprazolam 1 mg tablet	60	30	Devineni, Eula	[id number]	Insurance	[id number] A	Y	N	B	01/02/2024	01/02/2024	alprazolam 0.5 mg tablet	30	30	Devineni, Eula	[id number]	Insurance	[id number] A	Y	N	B	11/28/2023	12/06/2023	alprazolam 1 mg tablet	60	30	Devineni, Eula	[id number]	Insurance	[facility] [id number] A	Y	N	B	11/28/2023	12/06/2023	alprazolam 0.25 mg tablet	30	30	Devineni, Eula	[id number]	Insurance	[facility] [id number] A	Y	N	B	11/28/2023	11/29/2023	alprazolam 0.5 mg tablet	30	30	Devineni, Eula	[id number]	Insurance	[facility] [id number] A	Y	N	B	10/23/2023	11/08/2023	alprazolam 1 mg tablet	60	30	Devineni, Eula	[id number]	Insurance	[facility] [id number] A	Y	N	B	10/23/2023	11/08/2023	alprazolam 0.25 mg tablet	30	30	Devineni, Eula	[id number]	Insurance	[facility] [id number] A	Y	N	B	10/05/2023	10/05/2023	alprazolam 0.25 mg tablet	30	30	Devineni, Eula	[id number]	Insurance	[facility] [id number] A	Y	N	B	10/05/2023	10/05/2023	alprazolam 1 mg tablet	60	30	Devineni, Eula	[id number]	Insurance	[facility] [id number] A	Y	N	B	09/05/2023	09/06/2023	alprazolam 1 mg tablet	60	30	Devineni, Eula	[id number]	Insurance	[facility] [id number] A	Y	N	B	09/05/2023	09/06/2023	alprazolam 0.5 mg tablet	30	30	Devineni, Eula	[id number]	Insurance	[facility] [id number] A	Y	N	B	09/05/2023	09/06/2023	alprazolam 0.25 mg tablet	30	30	Devineni, Eula	[id number]	Insurance	[facility] [id number] A	Y	N	B	08/01/2023	08/04/2023	alprazolam 1 mg tablet	60	30	Devineni, Eula	[id number]	Insurance	[facility] [id number] A	Y	N	B	08/01/2023	08/04/2023	alprazolam 0.5 mg tablet	30	30	Devineni, Eula	[id number]	Insurance	[facility] [id number] A	Y	N	B	08/01/2023	08/04/2023	alprazolam 0.25 mg tablet	30	30	Devineni, Eula	[id number]	Insurance	[facility] [id number] A	Y	N	B	06/27/2023	07/06/2023	alprazolam 0.5 mg tablet	30	30	Devineni, Eula	[id number]	Insurance	[facility] [id number] A	Y	N	B	06/27/2023	07/06/2023	alprazolam 1 mg tablet	60	30	Devineni, Eula	[id number]	Insurance	[facility] [id number] A	Y	N	B	06/27/2023	07/06/2023	alprazolam 0.25 mg tablet	30	30	Devineni, Eula	[id number]	Insurance	[facility] [id number] A	Y	N	B	06/07/2023	06/07/2023	alprazolam 0.5 mg tablet	30	30	Devineni, Eula	[id number]	Insurance	[facility] [id number] A	Y	N	B	06/07/2023	06/07/2023	alprazolam 1 mg tablet	60	30	Devineni, Eula	[id number]	Insurance	[facility] [id number] A	Y	N	B	06/07/2023	06/07/2023	alprazolam 0.25 mg tablet	30	30	Devineni, Eula	[id number]	Insurance	[facility] [id number] A	Y	N	B	05/05/2023	05/07/2023	alprazolam 1 mg tablet	60	30	Eula Dillon	[id number]	Insurance	[facility] [id number] A	Y	N	B	05/05/2023	05/07/2023	alprazolam 0.5 mg tablet	30	30	Santana, Eula	[id number]	Insurance	[facility] [id number] A	Y	N	B	05/05/2023	05/07/2023	alprazolam 0.25 mg tablet	30	30	Eula Dillon	[id number]	Insurance	[facility] [id number] A	Y	N	B	03/27/2023	04/08/2023	alprazolam 0.25 mg tablet	30	30	Santana, Eula	[id number]	Insurance	[facility] [id number] A	Y	N	B	03/27/2023	04/08/2023	alprazolam 0.5 mg tablet	30	30	Santana, Eula	[id number]	Insurance	[facility] [id number] A	Y	N	B	03/27/2023	04/08/2023	alprazolam 1 mg tablet	60	30	Eula Dillon	[id number]	Insurance	[facility] [id number]

## 2024-04-08 ENCOUNTER — APPOINTMENT (OUTPATIENT)
Dept: PSYCHIATRY | Facility: CLINIC | Age: 44
End: 2024-04-08
Payer: COMMERCIAL

## 2024-04-08 PROCEDURE — 90791 PSYCH DIAGNOSTIC EVALUATION: CPT

## 2024-04-09 NOTE — DISCUSSION/SUMMARY
[FreeTextEntry1] : Patient is a 43 year old male that presents as oriented times three and with stable mood.  Patient had recent loss of cousin who passed away in his sleep from a brain anyeurysm. Patient shared struggling with grief of same and loss, as client reports he was very close to this cousin and they were of the same age.  Patient reports struggling with panic attacks, an existential dread that he is going to die suddenly now too and reports his own fears of mortality are impacting his daily living negatively. Patient is reporting heightened anxiety and depression.  Patient had previous psychotherapy with a therapist 3 years ago, but reports it was not the best fit for him. Patient does meet with Dr. Dillon for psychiatry. No reported hx of SI or HI or attempts of same. No hx of trauma. No hx of psychiatric hospitalizations.  Client reports he was a heavy drinker and would often go out to drink to highly intoxicated. 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  Patient lives at home with his parents, sister, niece and nephew and AUGUST. Patient reports a good relationship with family but reports that it is difficult to live at home due to a lot of noise at home. Patient reports his niece and nephew are young and very active. Client does report wanting a quieter place.  Patient has a girlfriend of over a year, she lives nearby and has three children. He reports a good relationship with her, but reports his friends  him over relationship as she has kids. Client is happy overall with relationship.  Patient works as a  for 8 years. Patient reports he enjoys his job, but does find it stressful. He reports missing work lately due to anxiety.   Patient reports an inactive social life and not a lot of friends, or hobbies, he reports he likes to stay home.  Based on DSM-V criteria, client has a diagnosis of LUIS ENRIQUE and Depression with panic attacks and would benefit from weekly psychotherapy to promote stable mood and mental health for self.

## 2024-04-09 NOTE — HISTORY OF PRESENT ILLNESS
[FreeTextEntry1] : Patient is a 43 year old male that presents as oriented times three and with stable mood.  Patient had recent loss of cousin who passed away in his sleep from a brain anyeurysm. Patient shared struggling with grief of same and loss, as client reports he was very close to this cousin and they were of the same age.  Patient reports struggling with panic attacks, an existential dread that he is going to die suddenly now too and reports his own fears of mortality are impacting his daily living negatively. Patient is reporting heightened anxiety and depression.  [FreeTextEntry2] : Patient had previous psychotherapy with a therapist 3 years ago, but reports it was not the best fit for him. Patient does meet with Dr. Dillon for psychiatry. No reported hx of SI or HI or attempts of same. No hx of trauma. No hx of psychiatric hospitalizations.  Client reports he was a heavy drinker and would often go out to drink to highly intoxicated. 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

## 2024-04-09 NOTE — SOCIAL HISTORY
[FreeTextEntry1] : Patient works as a  for 8 years. Patient reports he enjoys his job, but does find it stressful. He reports missing work lately due to anxiety.   Patient reports an inactive social life and not a lot of friends, or hobbies, he reports he likes to stay home.

## 2024-04-09 NOTE — ACTIVE PROBLEMS
[FreeTextEntry1] : Patient had recent loss of cousin who passed away in his sleep from a brain anyeurysm. Patient shared struggling with grief of same and loss, as client reports he was very close to this cousin and they were of the same age.  Patient reports struggling with panic attacks, an existential dread that he is going to die suddenly now too, and reports his own fears of mortality are impacting his daily living negatively. Patient is reporting heightened anxiety and depression.

## 2024-04-09 NOTE — RISK ASSESSMENT
[No] : No [Mood disorder] : mood disorder [Depressed mood/Anhedonia] : depressed mood/anhedonia [Triggering events leading to humiliation, shame, and/or despair] : triggering events leading to humiliation, shame, and/or despair (e.g. loss of relationship, financial or health status) (real or anticipated) [Identifies reasons for living] : identifies reasons for living [Supportive social network of family or friends] : supportive social network of family or friends [Cultural, spiritual and/or moral attitudes against suicide] : cultural, spiritual and/or moral attitudes against suicide [Ability to cope with stress] : ability to cope with stress [Responsibility to children, family, or others] : responsibility to children, family, or others [None in the patient's lifetime] : None in the patient's lifetime [No known risk factors] : No known risk factors

## 2024-04-09 NOTE — FAMILY HISTORY
[FreeTextEntry1] : Patient lives at home with his parents, sister, niece and nephew and AUGUST. Patient reports a good relationship with family but reports that it is difficult to live at home due to a lot of noise at home. Patient reports his niece and nephew are young and very active. Client does report wanting a quieter place.  Patient has a girlfriend of over a year, she lives nearby and has three children. He reports a good relationship with her, but reports his friends  him over relationship as she has kids. Client is happy overall with relationship.

## 2024-04-15 ENCOUNTER — APPOINTMENT (OUTPATIENT)
Dept: PSYCHIATRY | Facility: CLINIC | Age: 44
End: 2024-04-15
Payer: COMMERCIAL

## 2024-04-15 PROCEDURE — 90837 PSYTX W PT 60 MINUTES: CPT

## 2024-04-16 NOTE — PLAN
[FreeTextEntry2] : Goal: Client will reduce overall level, frequency and intensity of anxiety so that daily functioning is not impaired Objective: Client will develop appropriate relaxation and diversion activities to decrease level of anxiety. Intervention :Clinician will assist in developing awareness of cognitive messages that reinforce hopelessness and helplessness Intervention: Clinician will utilize logic and reality based thinking to challenge each unhealthy/negative thought for accuracy, replacing it with a positive, accurate thought Intervention: Clinician will teach Client various methods of stress reduction (medications, deep breathing, etc.) and assist in implementing these into daily life [Acceptance and Commitment Therapy] : Acceptance and Commitment Therapy  [Cognitive and/or Behavior Therapy] : Cognitive and/or Behavior Therapy  [Psychodynamic Therapy] : Psychodynamic Therapy  [Psychoeducation] : Psychoeducation  [Skills training (all types)] : Skills training (all types)  [Supportive Therapy] : Supportive Therapy [de-identified] : Client presented as oriented x and with stable mood. Session focus on negative cognition of "I am not good enough" Psychoeducation provided on fixed experienced and ways to navigate fixed ways of being and breakthrough same. Experiential homework provided. Pt responded well to same and was able to integrate positive reality-based messaging for self.  [FreeTextEntry1] : Client to attend weekly sessions to promote stable mental health and reach desired functioning for self.

## 2024-04-24 ENCOUNTER — APPOINTMENT (OUTPATIENT)
Dept: PSYCHIATRY | Facility: CLINIC | Age: 44
End: 2024-04-24
Payer: COMMERCIAL

## 2024-04-24 PROCEDURE — 90837 PSYTX W PT 60 MINUTES: CPT | Mod: 95

## 2024-04-25 NOTE — PLAN
[FreeTextEntry2] : Goal: Client will reduce overall level, frequency and intensity of anxiety so that daily functioning is not impaired Objective: Client will develop appropriate relaxation and diversion activities to decrease level of anxiety. Intervention :Clinician will assist in developing awareness of cognitive messages that reinforce hopelessness and helplessness Intervention: Clinician will utilize logic and reality based thinking to challenge each unhealthy/negative thought for accuracy, replacing it with a positive, accurate thought Intervention: Clinician will teach Client various methods of stress reduction (medications, deep breathing, etc.) and assist in implementing these into daily life [Acceptance and Commitment Therapy] : Acceptance and Commitment Therapy  [Cognitive and/or Behavior Therapy] : Cognitive and/or Behavior Therapy  [Psychodynamic Therapy] : Psychodynamic Therapy  [Psychoeducation] : Psychoeducation  [Skills training (all types)] : Skills training (all types)  [Supportive Therapy] : Supportive Therapy [de-identified] : Client presented as oriented x and with stable mood. Session focus on negative cognition of "I am not good enough" supportive therapy provided w/emotional support and validation and healthy re-framing via strengths perspective. Pt shared healthy changes made for self from previous session and further integrating positive messaging self and healthy action for self too. Session was virtual.  [FreeTextEntry1] : Client to attend weekly sessions to promote stable mental health and reach desired functioning for self.

## 2024-04-29 ENCOUNTER — APPOINTMENT (OUTPATIENT)
Dept: PSYCHIATRY | Facility: CLINIC | Age: 44
End: 2024-04-29
Payer: COMMERCIAL

## 2024-04-29 PROCEDURE — 90837 PSYTX W PT 60 MINUTES: CPT

## 2024-04-30 ENCOUNTER — APPOINTMENT (OUTPATIENT)
Dept: PSYCHIATRY | Facility: CLINIC | Age: 44
End: 2024-04-30
Payer: COMMERCIAL

## 2024-04-30 DIAGNOSIS — Z79.899 OTHER LONG TERM (CURRENT) DRUG THERAPY: ICD-10-CM

## 2024-04-30 PROCEDURE — 99214 OFFICE O/P EST MOD 30 MIN: CPT

## 2024-04-30 RX ORDER — ESCITALOPRAM OXALATE 20 MG/1
20 TABLET ORAL
Qty: 30 | Refills: 2 | Status: ACTIVE | COMMUNITY
Start: 2018-05-21 | End: 1900-01-01

## 2024-04-30 RX ORDER — GABAPENTIN 100 MG/1
100 CAPSULE ORAL
Qty: 90 | Refills: 2 | Status: ACTIVE | COMMUNITY
Start: 2019-12-13 | End: 1900-01-01

## 2024-04-30 NOTE — PLAN
[FreeTextEntry2] : Goal: Client will reduce overall level, frequency and intensity of anxiety so that daily functioning is not impaired Objective: Client will develop appropriate relaxation and diversion activities to decrease level of anxiety. Intervention :Clinician will assist in developing awareness of cognitive messages that reinforce hopelessness and helplessness Intervention: Clinician will utilize logic and reality based thinking to challenge each unhealthy/negative thought for accuracy, replacing it with a positive, accurate thought Intervention: Clinician will teach Client various methods of stress reduction (medications, deep breathing, etc.) and assist in implementing these into daily life [Acceptance and Commitment Therapy] : Acceptance and Commitment Therapy  [Cognitive and/or Behavior Therapy] : Cognitive and/or Behavior Therapy  [Psychodynamic Therapy] : Psychodynamic Therapy  [Psychoeducation] : Psychoeducation  [Skills training (all types)] : Skills training (all types)  [Supportive Therapy] : Supportive Therapy [de-identified] : Client presented as oriented x and with stable mood. Session focus on negative cognition of "I am not good enough" supportive therapy provided w/emotional supportive therapy.  EMDR Tx plan created and CARLA Screening completed NC: I am not good enough 1. High school-friend were going to college "I didn't go to do anything and prepare" 2. Body and not in good shape. 3. Middle school-pushed along, didn't fail, just felt left behind and always catching up (reports this is when panic attacks started)  Present- Fear of constantly procrastinating, and missing out on life  Future- Die early, or I will never do anything even if I live a long life.  Container excercise and safe place excercise completed.  Session was in-person. [FreeTextEntry1] : Client to attend weekly sessions to promote stable mental health and reach desired functioning for self.

## 2024-05-12 PROBLEM — Z79.899 CHRONIC PRESCRIPTION BENZODIAZEPINE USE: Status: ACTIVE | Noted: 2019-11-12

## 2024-05-13 ENCOUNTER — APPOINTMENT (OUTPATIENT)
Dept: PSYCHIATRY | Facility: CLINIC | Age: 44
End: 2024-05-13
Payer: COMMERCIAL

## 2024-05-13 PROCEDURE — 90837 PSYTX W PT 60 MINUTES: CPT

## 2024-05-13 NOTE — PLAN
[FreeTextEntry2] : Goal: Client will reduce overall level, frequency and intensity of anxiety so that daily functioning is not impaired Objective: Client will develop appropriate relaxation and diversion activities to decrease level of anxiety. Intervention :Clinician will assist in developing awareness of cognitive messages that reinforce hopelessness and helplessness Intervention: Clinician will utilize logic and reality based thinking to challenge each unhealthy/negative thought for accuracy, replacing it with a positive, accurate thought Intervention: Clinician will teach Client various methods of stress reduction (medications, deep breathing, etc.) and assist in implementing these into daily life [Acceptance and Commitment Therapy] : Acceptance and Commitment Therapy  [Cognitive and/or Behavior Therapy] : Cognitive and/or Behavior Therapy  [Psychodynamic Therapy] : Psychodynamic Therapy  [Psychoeducation] : Psychoeducation  [Skills training (all types)] : Skills training (all types)  [Supportive Therapy] : Supportive Therapy [de-identified] : Client presented as oriented x and with stable mood. Chair work done to help client find center within self and balance via anger and relaxed state. Pt gained healthy insights and seeing need to make healthy changes for self. Positive self talk integrated away from negative self talk. Further work in this area to continue. Session was in-person. [FreeTextEntry1] : Client to attend weekly sessions to promote stable mental health and reach desired functioning for self.   EMDR Tx plan created and CARLA Screening completed NC: I am not good enough 1. High school-friend were going to college "I didn't go to do anything and prepare" 2. Body and not in good shape. 3. Middle school-pushed along, didn't fail, just felt left behind and always catching up (reports this is when panic attacks started)  Present- Fear of constantly procrastinating, and missing out on life  Future- Die early, or I will never do anything even if I live a long life.  Container excercise and safe place excercise completed.

## 2024-05-20 ENCOUNTER — APPOINTMENT (OUTPATIENT)
Dept: PSYCHIATRY | Facility: CLINIC | Age: 44
End: 2024-05-20
Payer: COMMERCIAL

## 2024-05-20 PROCEDURE — 90837 PSYTX W PT 60 MINUTES: CPT

## 2024-05-21 NOTE — PLAN
[FreeTextEntry2] : Goal: Client will reduce overall level, frequency and intensity of anxiety so that daily functioning is not impaired Objective: Client will develop appropriate relaxation and diversion activities to decrease level of anxiety. Intervention :Clinician will assist in developing awareness of cognitive messages that reinforce hopelessness and helplessness Intervention: Clinician will utilize logic and reality based thinking to challenge each unhealthy/negative thought for accuracy, replacing it with a positive, accurate thought Intervention: Clinician will teach Client various methods of stress reduction (medications, deep breathing, etc.) and assist in implementing these into daily life [Acceptance and Commitment Therapy] : Acceptance and Commitment Therapy  [Cognitive and/or Behavior Therapy] : Cognitive and/or Behavior Therapy  [Psychodynamic Therapy] : Psychodynamic Therapy  [Psychoeducation] : Psychoeducation  [Skills training (all types)] : Skills training (all types)  [Supportive Therapy] : Supportive Therapy [de-identified] : Client presented as oriented x and with stable mood. EMDR work done today. SUDS went from 5-1. Further re-processing to continue. Client was able to gain greater insights into self vs NC and was able to re-frame perspective on self and past experiences. Safe place and container exercise completed as well as review of TICES log. Pt found EMDR helpful and wants to continue working. Session was in-person. [FreeTextEntry1] : Client to attend weekly sessions to promote stable mental health and reach desired functioning for self.   EMDR Tx plan created and CARLA Screening completed NC: I am not good enough 1. High school-friend were going to college "I didn't go to do anything and prepare" 2. Body and not in good shape. 3. Middle school-pushed along, didn't fail, just felt left behind and always catching up (reports this is when panic attacks started)  Present- Fear of constantly procrastinating, and missing out on life  Future- Die early, or I will never do anything even if I live a long life.  Container excercise and safe place excercise completed.

## 2024-05-28 ENCOUNTER — APPOINTMENT (OUTPATIENT)
Dept: PSYCHIATRY | Facility: CLINIC | Age: 44
End: 2024-05-28

## 2024-05-30 NOTE — PHYSICAL EXAM
[None] : none [Average] : average [Cooperative] : cooperative [Anxious] : anxious [Clear] : clear [Linear/Goal Directed] : linear/goal directed [None Reported] : none reported [WNL] : within normal limits [de-identified] : slightly constricted [FreeTextEntry7] : No SI/HI

## 2024-05-30 NOTE — PLAN
[Medication education provided] : Medication education provided. [Rationale for medication choices, possible risks/precautions, benefits, alternative treatment choices, and consequences of non-treatment discussed] : Rationale for medication choices, possible risks/precautions, benefits, alternative treatment choices, and consequences of non-treatment discussed with patient/family/caregiver  [FreeTextEntry5] : Psychoeducation and supportive therapy provided and recommended med changes and also provided patient information for bereavement support groups.  Medication: Xanax 1 mg BID, and 0.5 mg in afternoon, 0.25 mg PM  continue Gabapentin 200 mg at 4 PM and 100-200 mg 9 PM and Lexapro 20 mg/day Educated patient of importance of remaining abstinent from drugs and alcohol.  Emergency procedures were discussed: pt. educated to call 911 or go to nearest ER for worsening of symptoms/suicidal/homicidal ideation.  RTC in 4-6 weeks or earlier as needed.  Patient expressed understanding and agreement with above plan.   I stop checked today: Reference #: [id number]  Practitioner Count: 2 Pharmacy Count: 1 Current Opioid Prescriptions: 0 Current Benzodiazepine Prescriptions: 3 Current Stimulant Prescriptions: 0   Patient Demographic Information (PDI)     PDI	First Name	Last Name	Birth Date	Gender	Street Address	City	State	Zip Code KAMALJIT Watters	1980	Male	[address]	[facility]	[address]  Prescription Information    PDI Filter:   PDI	My Rx	Current Rx	Drug Type	Rx Written	Rx Dispensed	Drug	Quantity	Days Supply	Prescriber Name	Prescriber BRITTANY #	Payment Method	Dispenser A	N	Y	B	04/02/2024	04/03/2024	alprazolam 0.5 mg tablet	30	30	Treitel, Arya	[id number]	Insurance	[id number] A	N	Y	B	04/02/2024	04/03/2024	alprazolam 1 mg tablet	60	30	Treitel, Arya	[id number]	Insurance	[id number] A	N	Y	B	04/02/2024	04/03/2024	alprazolam 0.25 mg tablet	30	30	Treitel, Arya	[id number]	Insurance	[id number] A	Y	N	B	02/28/2024	03/04/2024	alprazolam 0.5 mg tablet	30	30	DevineniEula	[id number]	Insurance	[id number] A	Y	N	B	02/28/2024	03/04/2024	alprazolam 0.25 mg tablet	30	30	DevineniEula	[id number]	Insurance	[id number] A	Y	N	B	02/28/2024	03/04/2024	alprazolam 1 mg tablet	60	30	DevineniEula	[id number]	Insurance	[id number] A	Y	N	B	02/02/2024	02/03/2024	alprazolam 0.25 mg tablet	30	30	Devineni, Eula	[id number]	Insurance	[id number] A	Y	N	B	02/02/2024	02/03/2024	alprazolam 0.5 mg tablet	30	30	Devineni, Eula	[id number]	Insurance	[id number] A	Y	N	B	02/02/2024	02/03/2024	alprazolam 1 mg tablet	60	30	Devineni, Eula	[id number]	Insurance	[id number] A	Y	N	B	01/02/2024	01/05/2024	alprazolam 0.25 mg tablet	30	30	Devineni, Eula	[id number]	Insurance	[id number] A	Y	N	B	01/02/2024	01/05/2024	alprazolam 1 mg tablet	60	30	Devineni, Eula	[id number]	Insurance	[id number] A	Y	N	B	01/02/2024	01/02/2024	alprazolam 0.5 mg tablet	30	30	Devineni, Eula	[id number]	Insurance	[id number] A	Y	N	B	11/28/2023	12/06/2023	alprazolam 1 mg tablet	60	30	Devineni, Eula	[id number]	Insurance	[facility] [id number] A	Y	N	B	11/28/2023	12/06/2023	alprazolam 0.25 mg tablet	30	30	Devineni, Eula	[id number]	Insurance	[facility] [id number] A	Y	N	B	11/28/2023	11/29/2023	alprazolam 0.5 mg tablet	30	30	Devineni, Eula	[id number]	Insurance	[facility] [id number] A	Y	N	B	10/23/2023	11/08/2023	alprazolam 1 mg tablet	60	30	Devineni, Eula	[id number]	Insurance	[facility] [id number] A	Y	N	B	10/23/2023	11/08/2023	alprazolam 0.25 mg tablet	30	30	Devineni, Eula	[id number]	Insurance	[facility] [id number] A	Y	N	B	10/05/2023	10/05/2023	alprazolam 0.25 mg tablet	30	30	Devineni, Eula	[id number]	Insurance	[facility] [id number] A	Y	N	B	10/05/2023	10/05/2023	alprazolam 1 mg tablet	60	30	Devineni, Eula	[id number]	Insurance	[facility] [id number] A	Y	N	B	09/05/2023	09/06/2023	alprazolam 1 mg tablet	60	30	Devineni, Eula	[id number]	Insurance	[facility] [id number] A	Y	N	B	09/05/2023	09/06/2023	alprazolam 0.5 mg tablet	30	30	Devineni, Eula	[id number]	Insurance	[facility] [id number] A	Y	N	B	09/05/2023	09/06/2023	alprazolam 0.25 mg tablet	30	30	Devineni, Eula	[id number]	Insurance	[facility] [id number] A	Y	N	B	08/01/2023	08/04/2023	alprazolam 1 mg tablet	60	30	Devineni, Eula	[id number]	Insurance	[facility] [id number] A	Y	N	B	08/01/2023	08/04/2023	alprazolam 0.5 mg tablet	30	30	Devineni, Eula	[id number]	Insurance	[facility] [id number] A	Y	N	B	08/01/2023	08/04/2023	alprazolam 0.25 mg tablet	30	30	Devineni, Eula	[id number]	Insurance	[facility] [id number] A	Y	N	B	06/27/2023	07/06/2023	alprazolam 0.5 mg tablet	30	30	Devineni, Eula	[id number]	Insurance	[facility] [id number] A	Y	N	B	06/27/2023	07/06/2023	alprazolam 1 mg tablet	60	30	Devineni, Eula	[id number]	Insurance	[facility] [id number] A	Y	N	B	06/27/2023	07/06/2023	alprazolam 0.25 mg tablet	30	30	Devineni, Eula	[id number]	Insurance	[facility] [id number] A	Y	N	B	06/07/2023	06/07/2023	alprazolam 0.5 mg tablet	30	30	Eula Dillon	[id number]	Insurance	[facility] [id number] A	Y	N	B	06/07/2023	06/07/2023	alprazolam 1 mg tablet	60	30	Eula Dillon	[id number]	Insurance	[facility] [id number] A	Y	N	B	06/07/2023	06/07/2023	alprazolam 0.25 mg tablet	30	30	Eula Dillon	[id number]	Insurance	[facility] [id number] A	Y	N	B	05/05/2023	05/07/2023	alprazolam 1 mg tablet	60	30	Eula Dillon	[id number]	Insurance	[facility] [id number] A	Y	N	B	05/05/2023	05/07/2023	alprazolam 0.5 mg tablet	30	30	Eula Dillon	[id number]	Insurance	[facility] [id number] A	Y	N	B	05/05/2023	05/07/2023	alprazolam 0.25 mg tablet	30	30	Eula Dillon	[id number]	Insurance	[facility] [id number]

## 2024-05-30 NOTE — DISCUSSION/SUMMARY
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 He reports he is on Lexapro 10 mg/day for past 2 years and when he initially started taking Lexapro he noticed a slight reduction of anxiety.  He wants to come off Xanax, but is afraid or withdrawal sx and worsening anxiety   12/13/19: Patient reports he is about the same as last visit, and he tried to lower Xanax dose but not successful, but after education about slow taper and using Gabapentin to help with tapering he states he does want to go ahead with tapering off Xanax, and states he feels comfortable with plan to use Gabapentin to help with taper.  1/8/2020: Patient did not start tapering Xanax as planned, reports he was sick and also more stressed at work, and did not want to start new med along with antibiotics. 2/4/2020: Patient is taking Xanax 3.5 mg/day for past 2 weeks, and taking gabapentin 100 mg BID with good effect, but a bit apprehensive to cut down Xanax dose further at this time.   2/25/2020: Patient reports he feels more anxious when he does not have to work because he does not have a daily routine and also situation at home on some days make him anxious. He states he wants to lower Xanax, but when he feels stressed, even if not sever he takes Xanax to help cope with the situation.  3/24/2020: Patient reports he is managing anxiety and Xanax use better and has used gabapentin as needed for anxiety a few times, in addition to 4 PM and HS dose.  He states he likes weekly Rx and would like to continue the same  Considering he is doing well with Xanax dose reduction, plan to further taper Xanax dose.  6/2/2020: patient states he is doing okay, though has not taken more than rxed Xanax he states he is "struggling", i.e., eagerly waiting for his 4 PM dose; denies depression and denies panic attacks.  7/14/2020: Patient states he is taking Gabapentin as recommended and feels he is able to manage anxiety better and wants to continue with current regimen for another month before attempting to further taper Xanax  10/7/2020:Patient states he feels anxiety and depression sx are stable, wants to continue with current regimen and gradually try on his own to reduce Xanax dose to 3 mg/day.  11/25/2020: Patient remains stable, feels anxious, but agrees he would like to reduce Xanax dose and agrees to a trial of lowering dose.  1/6/2021: Patient was able to reduce Xanax dose down to 1 mg TID, feels gabapentin is helping.  2/17/2021: Patient remains stable with current dose of meds, and coping well with his anxiety and psychological dependence on Xanax 5/3/2021: Patient continues to remain stable, coping well with the recent stressors, adhering to the prescribed medication regimen. 6/14/2021: Patient reports he is continuing to feel stable and feels current medication regimen is helping with anxiety and denies feeling depressed. 08/09/021:  Patient continues to remain stable,and adhering to rxed meds, he was able to gradually taper Xanax from 1 mg QID to 1 mg TID, but as previously anxious and worried about further reduction in dose.   09/14/2021: Patient overall remained stable but reports increase in anxiety related to work stressors which he appears to be coping effectively and feels that the current medication regimen is working better for him and he does not feel ready to further cut down Xanax at this time.  11/15/2021: Patient reports change in work assignment has helped reduce stress and states current meds helping keep his anxiety and depression sx in good control.   1/11/2022: Patient reports he is doing well, anxiety sx in good control and no depression. Patient states he feels he is ready to start reducing Xanax and would like to try on his own to reduce the dose, slowly.   2/09/2022: Patient states he tried to lower Xanax dose to 0.75 mg in AM, but could not do it with cutting Xanax 0.5 mg tab and when he lowered it to 0.5 mg he felt anxious and continued taking 1 mg TID instead. He states he however does want to try lowering Xanax and wants to try a slower taper and wants to take 0.75 mg= 0.5+0.25 mg tabs. Informed patient that when he is due for next Xanax Rx will lower the dose and send 2 weeks Rx to monitor and if tolerating will continue it for a month before next dose reduction   4/26/2022: Patient reports feeling worried lowering his afternoon dose of Xanax, however, no withdrawal sx and wants to continue to keep current dose of Xanax.   6/8/2022: Patient continues to remain stable, anxiety in good control, no depression.   7/20/2022: Patient remains stable and reports anxiety and depression sx in gfood control. Patient reports that he had tried to skip the Xanax 0.25 mg dose a couple of times a week, but he worries if he may have adverse effects, or have withdrawal like seizures and states he realizes it may be more psychological dependence that he has, and will continue to try reducing the dose slowly.   8/17/2022: Patient continues to remain stable reports no symptoms of depression and anxiety symptoms are fairly controlled with the current medication regimen, he is however reluctant to further go down on the Xanax and at this point has not felt the need or is taking more than prescribed.  12/2/2022: Patient overall continues to remain stable with no symptoms of depression and anxiety symptoms fairly good control, slight increase in anxiety stress related to work but feels that the current doses of medications both Xanax and gabapentin are helping and he has not attempted to lower the Xanax dose and would like to continue the same dose at this time.   3/27/2023: Patient is taking same amount of Xanax/day, but taking less dose in the day time, and states he uses Xanax "as a crutch" to avoid feeling anxious when he is thinking about things in his life that he could change and wants to, but avoids thinking and or doing anything about it because he feels overwhelmed when these thoughts come to him when he is alone in the evening at home.   8/1/2023: Paient continues to remains stable, and states some days he is feeling a little more tried after taking AM Xanax, but is concerned about lowering the dose overall, but agrees to [facility] dosing schedule to take lower dose in AM and monitor if he could consistently lower the dose.   10/23/2023: Patient reports increased stress at work and had a panic attack at work hopes get help from work.    12/11/2023: Patient is doing better, able to cope with stress better, and no panic attacks since last visit.   3/26/2024: Patient reported an increase in anxiety symptoms and also experiencing panic attacks after recent death in the family trip triggered an increase in anxiety symptoms and would beenfit from increasing gabapentin to help with anxiety  4/30/2024: The patient reported improved adherence with gabapentin in PM and learning to cope with cousins' death by proactively taking care of his own health issues.

## 2024-06-03 ENCOUNTER — APPOINTMENT (OUTPATIENT)
Dept: PSYCHIATRY | Facility: CLINIC | Age: 44
End: 2024-06-03
Payer: COMMERCIAL

## 2024-06-03 PROCEDURE — 90837 PSYTX W PT 60 MINUTES: CPT

## 2024-06-04 NOTE — PLAN
[FreeTextEntry2] : Goal: Client will reduce overall level, frequency and intensity of anxiety so that daily functioning is not impaired Objective: Client will develop appropriate relaxation and diversion activities to decrease level of anxiety. Intervention :Clinician will assist in developing awareness of cognitive messages that reinforce hopelessness and helplessness Intervention: Clinician will utilize logic and reality based thinking to challenge each unhealthy/negative thought for accuracy, replacing it with a positive, accurate thought Intervention: Clinician will teach Client various methods of stress reduction (medications, deep breathing, etc.) and assist in implementing these into daily life [Acceptance and Commitment Therapy] : Acceptance and Commitment Therapy  [Cognitive and/or Behavior Therapy] : Cognitive and/or Behavior Therapy  [Psychodynamic Therapy] : Psychodynamic Therapy  [Psychoeducation] : Psychoeducation  [Skills training (all types)] : Skills training (all types)  [Supportive Therapy] : Supportive Therapy [de-identified] : Client presented as oriented x and with stable mood. EMDR work done today. SUDS went from 8-0. Further re-processing to continue. Client made insights into self vs NC and was able to re-frame perspective on self and past experiences. VOC went from 2-7 for PC>  Safe place completed and review of TICES log. Next session to be global 8 evaluation. HS-memory sucessfully re-processed.  Session was in-person. [FreeTextEntry1] : Client to attend weekly sessions to promote stable mental health and reach desired functioning for self.   EMDR Tx plan created and CARLA Screening completed NC: I am not good enough 1. High school-friend were going to college "I didn't go to do anything and prepare" 2. Body and not in good shape. 3. Middle school-pushed along, didn't fail, just felt left behind and always catching up (reports this is when panic attacks started)  Present- Fear of constantly procrastinating, and missing out on life  Future- Die early, or I will never do anything even if I live a long life.  Container excercise and safe place excercise completed.

## 2024-06-10 ENCOUNTER — APPOINTMENT (OUTPATIENT)
Dept: PSYCHIATRY | Facility: CLINIC | Age: 44
End: 2024-06-10
Payer: COMMERCIAL

## 2024-06-10 PROCEDURE — 90837 PSYTX W PT 60 MINUTES: CPT

## 2024-06-12 ENCOUNTER — APPOINTMENT (OUTPATIENT)
Dept: PSYCHIATRY | Facility: CLINIC | Age: 44
End: 2024-06-12
Payer: COMMERCIAL

## 2024-06-12 DIAGNOSIS — F40.01 AGORAPHOBIA WITH PANIC DISORDER: ICD-10-CM

## 2024-06-12 DIAGNOSIS — F32.A DEPRESSION, UNSPECIFIED: ICD-10-CM

## 2024-06-12 DIAGNOSIS — F41.1 GENERALIZED ANXIETY DISORDER: ICD-10-CM

## 2024-06-12 DIAGNOSIS — F13.20 SEDATIVE, HYPNOTIC OR ANXIOLYTIC DEPENDENCE, UNCOMPLICATED: ICD-10-CM

## 2024-06-12 PROCEDURE — 99214 OFFICE O/P EST MOD 30 MIN: CPT | Mod: 95

## 2024-06-12 PROCEDURE — G2211 COMPLEX E/M VISIT ADD ON: CPT | Mod: 95

## 2024-06-12 RX ORDER — ALPRAZOLAM 1 MG/1
1 TABLET ORAL
Qty: 60 | Refills: 0 | Status: ACTIVE | COMMUNITY
Start: 1900-01-01 | End: 1900-01-01

## 2024-06-12 RX ORDER — ALPRAZOLAM 0.25 MG/1
0.25 TABLET ORAL
Qty: 30 | Refills: 0 | Status: ACTIVE | COMMUNITY
Start: 2022-02-18 | End: 1900-01-01

## 2024-06-12 RX ORDER — ALPRAZOLAM 0.5 MG/1
0.5 TABLET ORAL
Qty: 30 | Refills: 0 | Status: ACTIVE | COMMUNITY
Start: 2022-01-24 | End: 1900-01-01

## 2024-06-12 NOTE — PLAN
[FreeTextEntry2] : Goal: Client will reduce overall level, frequency and intensity of anxiety so that daily functioning is not impaired Objective: Client will develop appropriate relaxation and diversion activities to decrease level of anxiety. Intervention :Clinician will assist in developing awareness of cognitive messages that reinforce hopelessness and helplessness Intervention: Clinician will utilize logic and reality based thinking to challenge each unhealthy/negative thought for accuracy, replacing it with a positive, accurate thought Intervention: Clinician will teach Client various methods of stress reduction (medications, deep breathing, etc.) and assist in implementing these into daily life [Acceptance and Commitment Therapy] : Acceptance and Commitment Therapy  [Cognitive and/or Behavior Therapy] : Cognitive and/or Behavior Therapy  [Psychodynamic Therapy] : Psychodynamic Therapy  [Psychoeducation] : Psychoeducation  [Skills training (all types)] : Skills training (all types)  [Supportive Therapy] : Supportive Therapy [de-identified] : Client presented as oriented x and with stable mood. Global 8 evaluation completed, patient reported healthy insights from previous EMDR session and healthy changes. Session focus now on putting change into action via staes of change. CBT and ACT utilized to empower client to create a plan of action for self that he feels id doable and incorporates healthy risks etc. Patient responded well to same.  Session was in-person. [FreeTextEntry1] : Client to attend weekly sessions to promote stable mental health and reach desired functioning for self.   EMDR Tx plan created and CARLA Screening completed NC: I am not good enough 1. High school-friend were going to college "I didn't go to do anything and prepare" 2. Body and not in good shape. 3. Middle school-pushed along, didn't fail, just felt left behind and always catching up (reports this is when panic attacks started)  Present- Fear of constantly procrastinating, and missing out on life  Future- Die early, or I will never do anything even if I live a long life.  Container excercise and safe place excercise completed.

## 2024-06-16 NOTE — HISTORY OF PRESENT ILLNESS
[FreeTextEntry1] : The patient states he is feeling much better since last visit. States he is less anxious, able to go to work and stay at work, did not have panic attacks since last visit and did not leave work due to anxiety/panic attacks.  He states individual psychotherapy is also helping him learn coping skills to manage anxiety.  States he is taking  gabapentin more frequently. He reported sleep and appetite: good.  No panic attacks since the last visit.  Patient denied any alcohol use, any substance abuse. He had MRI of upper arm- rxed PT he is not going to work with school this summer. He states he has to decide next year if he would like to keep same job, then will move to .

## 2024-06-16 NOTE — REASON FOR VISIT
[Patient] : Patient [Patient preference] : as per patient preference [Telehealth (audio & video) - Individual/Group] : This visit was provided via telehealth using real-time 2-way audio visual technology. [Other Location: e.g. Home (Enter Location, City,State)___] : The patient, [unfilled], was located at [unfilled] at the time of the visit. [Verbal consent obtained from patient/other participant(s)] : Verbal consent for telehealth/telephonic services obtained from patient/other participant(s) [FreeTextEntry1] : depression and anxiety

## 2024-06-16 NOTE — PHYSICAL EXAM
[None] : none [Average] : average [Cooperative] : cooperative [Anxious] : anxious [Clear] : clear [Linear/Goal Directed] : linear/goal directed [None Reported] : none reported [WNL] : within normal limits [de-identified] : slightly constricted [Full] : full [FreeTextEntry8] : "better"  [FreeTextEntry7] : No SI/HI

## 2024-06-16 NOTE — PLAN
[Medication education provided] : Medication education provided. [Rationale for medication choices, possible risks/precautions, benefits, alternative treatment choices, and consequences of non-treatment discussed] : Rationale for medication choices, possible risks/precautions, benefits, alternative treatment choices, and consequences of non-treatment discussed with patient/family/caregiver  [FreeTextEntry5] : Psychoeducation and supportive therapy provided and recommended med changes and also provided patient information for bereavement support groups.  Medication: Xanax 1 mg BID, and 0.5 mg in afternoon, 0.25 mg PM  continue Gabapentin 200 mg at 4 PM and 100-200 mg 9 PM and Lexapro 20 mg/day Educated patient of importance of remaining abstinent from drugs and alcohol.  Emergency procedures were discussed: pt. educated to call 911 or go to nearest ER for worsening of symptoms/suicidal/homicidal ideation.  Continue individual psychotherapy  RTC in 3 months or earlier as needed.  Patient expressed understanding and agreement with above plan.   I stop checked today: Reference #: [id number]  Practitioner Count: 2 Pharmacy Count: 1 Current Opioid Prescriptions: 0 Current Benzodiazepine Prescriptions: 3 Current Stimulant Prescriptions: 0   Patient Demographic Information (PDI)     PDI	First Name	Last Name	Birth Date	Gender	Street Address	[facility]	Zip Code [place]	1980	Male	[address]	[facility]	[id number]  Prescription Information    PDI Filter:   PDI	My Rx	Current Rx	Drug Type	Rx Written	Rx Dispensed	Drug	Quantity	Days Supply	Prescriber Name	Prescriber BRITTANY #	Payment Method	Dispenser A	Y	Y	B	05/30/2024	05/31/2024	alprazolam 0.25 mg tablet	30	30	Devineni, Eula	[id number]	Insurance	[id number] A	Y	Y	B	05/30/2024	05/31/2024	alprazolam 1 mg tablet	60	30	Devineni, Eula	[id number]	Insurance	[id number] A	Y	Y	B	05/30/2024	05/31/2024	alprazolam 0.5 mg tablet	30	30	Devineni, Eula	[id number]	Insurance	[id number] A	Y	N	B	04/30/2024	05/02/2024	alprazolam 1 mg tablet	60	30	Devineni, Eula	[id number]	Insurance	[id number] A	Y	N	B	04/30/2024	05/02/2024	alprazolam 0.5 mg tablet	30	30	Devineni, Eula	[id number]	Insurance	[id number] A	Y	N	B	04/30/2024	05/02/2024	alprazolam 0.25 mg tablet	30	30	Devineni, Eula [id number]	Insurance	[id number] A	N	N	B	04/02/2024	04/03/2024	alprazolam 0.5 mg tablet	30	30	Treitel, Arya	[id number]	Insurance	[id number] A	N	N	B	04/02/2024	04/03/2024	alprazolam 1 mg tablet	60	30	Treitel, Arya	[id number]	Insurance	[id number] A	N	N	B	04/02/2024	04/03/2024	alprazolam 0.25 mg tablet	30	30	Treitel, Arya	[id number]	Insurance	[id number] A	Y	N	B	02/28/2024	03/04/2024	alprazolam 0.5 mg tablet	30	30	Devineni, Eula	[id number]	Insurance	[id number] A	Y	N	B	02/28/2024	03/04/2024	alprazolam 0.25 mg tablet	30	30	Devineni, Eula	[id number]	Insurance	[id number] A	Y	N	B	02/28/2024	03/04/2024	alprazolam 1 mg tablet	60	30	Devineni, Eula	[id number]	Insurance	[id number] A	Y	N	B	02/02/2024	02/03/2024	alprazolam 0.25 mg tablet	30	30	Devineni, Eula	[id number]	Insurance	[id number] A	Y	N	B	02/02/2024	02/03/2024	alprazolam 0.5 mg tablet	30	30	Devineni, Eula	[id number]	Insurance	[id number] A	Y	N	B	02/02/2024	02/03/2024	alprazolam 1 mg tablet	60	30	Devineni, Eula	[id number]	Insurance	[id number] A	Y	N	B	01/02/2024	01/05/2024	alprazolam 0.25 mg tablet	30	30	Devineni, Eula	[id number]	Insurance	[id number] A	Y	N	B	01/02/2024	01/05/2024	alprazolam 1 mg tablet	60	30	Devineni, Eula	[id number]	Insurance	[id number] A	Y	N	B	01/02/2024	01/02/2024	alprazolam 0.5 mg tablet	30	30	Devineni, Eula	[id number]	Insurance	[id number] A	Y	N	B	11/28/2023	12/06/2023	alprazolam 1 mg tablet	60	30	Devineni, Eula	[id number]	Insurance	[facility] [id number] A	Y	N	B	11/28/2023	12/06/2023	alprazolam 0.25 mg tablet	30	30	Devineni, Eula	[id number]	Insurance	[facility] [id number] A	Y	N	B	11/28/2023	11/29/2023	alprazolam 0.5 mg tablet	30	30	Devineni, Eula	[id number]	Insurance	[facility] [id number] A	Y	N	B	10/23/2023	11/08/2023	alprazolam 1 mg tablet	60	30	Devineni, Eula	[id number]	Insurance	[facility] [id number] A	Y	N	B	10/23/2023	11/08/2023	alprazolam 0.25 mg tablet	30	30	Devineni, Eula	[id number]	Insurance	[facility] [id number] A	Y	N	B	10/05/2023	10/05/2023	alprazolam 0.25 mg tablet	30	30	Devineni, Eula	[id number]	Insurance	[facility] [id number] A	Y	N	B	10/05/2023	10/05/2023	alprazolam 1 mg tablet	60	30	Devineni, Eula	[id number]	Insurance	[facility] [id number] A	Y	N	B	09/05/2023	09/06/2023	alprazolam 1 mg tablet	60	30	Devineni, Eula	[id number]	Insurance	[facility] [id number] A	Y	N	B	09/05/2023	09/06/2023	alprazolam 0.5 mg tablet	30	30	Devineni, Eula	[id number]	Insurance	[facility] [id number] A	Y	N	B	09/05/2023	09/06/2023	alprazolam 0.25 mg tablet	30	30	Devineni, Eula	[id number]	Insurance	[facility] [id number] A	Y	N	B	08/01/2023	08/04/2023	alprazolam 1 mg tablet	60	30	Devijackson, Eula	[id number]	Insurance	[facility] [id number] A	Y	N	B	08/01/2023	08/04/2023	alprazolam 0.5 mg tablet	30	30	Devinejeff, Eula	[id number]	Insurance	[facility] [id number] A	Y	N	B	08/01/2023	08/04/2023	alprazolam 0.25 mg tablet	30	30	Santana, Eula	[id number]	Insurance	[facility] [id number] A	Y	N	B	06/27/2023	07/06/2023	alprazolam 0.5 mg tablet	30	30	Devijackson, Eula	[id number]	Insurance	[facility] [id number] A	Y	N	B	06/27/2023	07/06/2023	alprazolam 1 mg tablet	60	30	Devinejeff, Eula	[id number]	Insurance	[facility] [id number] A	Y	N	B	06/27/2023	07/06/2023	alprazolam 0.25 mg tablet	30	30	Devinejeff, Eula	[id number]	Insurance	[facility] [id number]

## 2024-06-16 NOTE — DISCUSSION/SUMMARY
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 He reports he is on Lexapro 10 mg/day for past 2 years and when he initially started taking Lexapro he noticed a slight reduction of anxiety.  He wants to come off Xanax, but is afraid or withdrawal sx and worsening anxiety   12/13/19: Patient reports he is about the same as last visit, and he tried to lower Xanax dose but not successful, but after education about slow taper and using Gabapentin to help with tapering he states he does want to go ahead with tapering off Xanax, and states he feels comfortable with plan to use Gabapentin to help with taper.  1/8/2020: Patient did not start tapering Xanax as planned, reports he was sick and also more stressed at work, and did not want to start new med along with antibiotics. 2/4/2020: Patient is taking Xanax 3.5 mg/day for past 2 weeks, and taking gabapentin 100 mg BID with good effect, but a bit apprehensive to cut down Xanax dose further at this time.   2/25/2020: Patient reports he feels more anxious when he does not have to work because he does not have a daily routine and also situation at home on some days make him anxious. He states he wants to lower Xanax, but when he feels stressed, even if not sever he takes Xanax to help cope with the situation.  3/24/2020: Patient reports he is managing anxiety and Xanax use better and has used gabapentin as needed for anxiety a few times, in addition to 4 PM and HS dose.  He states he likes weekly Rx and would like to continue the same  Considering he is doing well with Xanax dose reduction, plan to further taper Xanax dose.  6/2/2020: patient states he is doing okay, though has not taken more than rxed Xanax he states he is "struggling", i.e., eagerly waiting for his 4 PM dose; denies depression and denies panic attacks.  7/14/2020: Patient states he is taking Gabapentin as recommended and feels he is able to manage anxiety better and wants to continue with current regimen for another month before attempting to further taper Xanax  10/7/2020: Patient states he feels anxiety and depression sx are stable, wants to continue with current regimen and gradually try on his own to reduce Xanax dose to 3 mg/day.  11/25/2020: Patient remains stable, feels anxious, but agrees he would like to reduce Xanax dose and agrees to a trial of lowering dose.  1/6/2021: Patient was able to reduce Xanax dose down to 1 mg TID, feels gabapentin is helping.  2/17/2021: Patient remains stable with current dose of meds, and coping well with his anxiety and psychological dependence on Xanax 5/3/2021: Patient continues to remain stable, coping well with the recent stressors, adhering to the prescribed medication regimen. 6/14/2021: Patient reports he is continuing to feel stable and feels current medication regimen is helping with anxiety and denies feeling depressed. 08/09/021:  Patient continues to remain stable,and adhering to rxed meds, he was able to gradually taper Xanax from 1 mg QID to 1 mg TID, but as previously anxious and worried about further reduction in dose.   09/14/2021: Patient overall remained stable but reports increase in anxiety related to work stressors which he appears to be coping effectively and feels that the current medication regimen is working better for him and he does not feel ready to further cut down Xanax at this time.  11/15/2021: Patient reports change in work assignment has helped reduce stress and states current meds helping keep his anxiety and depression sx in good control.   1/11/2022: Patient reports he is doing well, anxiety sx in good control and no depression. Patient states he feels he is ready to start reducing Xanax and would like to try on his own to reduce the dose, slowly.   2/09/2022: Patient states he tried to lower Xanax dose to 0.75 mg in AM, but could not do it with cutting Xanax 0.5 mg tab and when he lowered it to 0.5 mg he felt anxious and continued taking 1 mg TID instead. He states he however does want to try lowering Xanax and wants to try a slower taper and wants to take 0.75 mg= 0.5+0.25 mg tabs. Informed patient that when he is due for next Xanax Rx will lower the dose and send 2 weeks Rx to monitor and if tolerating will continue it for a month before next dose reduction   4/26/2022: Patient reports feeling worried lowering his afternoon dose of Xanax, however, no withdrawal sx and wants to continue to keep current dose of Xanax.   6/8/2022: Patient continues to remain stable, anxiety in good control, no depression.   7/20/2022: Patient remains stable and reports anxiety and depression sx in gfood control. Patient reports that he had tried to skip the Xanax 0.25 mg dose a couple of times a week, but he worries if he may have adverse effects, or have withdrawal like seizures and states he realizes it may be more psychological dependence that he has, and will continue to try reducing the dose slowly.   8/17/2022: Patient continues to remain stable reports no symptoms of depression and anxiety symptoms are fairly controlled with the current medication regimen, he is however reluctant to further go down on the Xanax and at this point has not felt the need or is taking more than prescribed.  12/2/2022: Patient overall continues to remain stable with no symptoms of depression and anxiety symptoms fairly good control, slight increase in anxiety stress related to work but feels that the current doses of medications both Xanax and gabapentin are helping and he has not attempted to lower the Xanax dose and would like to continue the same dose at this time.   3/27/2023: Patient is taking same amount of Xanax/day, but taking less dose in the day time, and states he uses Xanax "as a crutch" to avoid feeling anxious when he is thinking about things in his life that he could change and wants to, but avoids thinking and or doing anything about it because he feels overwhelmed when these thoughts come to him when he is alone in the evening at home.   8/1/2023: Paient continues to remains stable, and states some days he is feeling a little more tried after taking AM Xanax, but is concerned about lowering the dose overall, but agrees to [facility] dosing schedule to take lower dose in AM and monitor if he could consistently lower the dose.   10/23/2023: Patient reports increased stress at work and had a panic attack at work hopes get help from work.    12/11/2023: Patient is doing better, able to cope with stress better, and no panic attacks since last visit.   3/26/2024: Patient reported an increase in anxiety symptoms and also experiencing panic attacks after recent death in the family trip triggered an increase in anxiety symptoms and would beenfit from increasing gabapentin to help with anxiety  4/30/2024: The patient reported improved adherence with gabapentin in PM and learning to cope with cousins' death by proactively taking care of his own health issues.   6/12/2024: The patient is reporting much reduced anxiety since last visit and no panic attacks, good mood, not depressed.

## 2024-06-18 ENCOUNTER — APPOINTMENT (OUTPATIENT)
Dept: PSYCHIATRY | Facility: CLINIC | Age: 44
End: 2024-06-18

## 2024-06-24 ENCOUNTER — APPOINTMENT (OUTPATIENT)
Dept: PSYCHIATRY | Facility: CLINIC | Age: 44
End: 2024-06-24
Payer: COMMERCIAL

## 2024-06-24 PROCEDURE — 90837 PSYTX W PT 60 MINUTES: CPT

## 2024-07-15 ENCOUNTER — APPOINTMENT (OUTPATIENT)
Dept: PSYCHIATRY | Facility: CLINIC | Age: 44
End: 2024-07-15
Payer: COMMERCIAL

## 2024-07-15 PROCEDURE — 90837 PSYTX W PT 60 MINUTES: CPT

## 2024-07-22 ENCOUNTER — APPOINTMENT (OUTPATIENT)
Dept: PSYCHIATRY | Facility: CLINIC | Age: 44
End: 2024-07-22
Payer: COMMERCIAL

## 2024-07-22 PROCEDURE — 90837 PSYTX W PT 60 MINUTES: CPT

## 2024-07-25 NOTE — PLAN
[FreeTextEntry2] : Goal: Client will reduce overall level, frequency and intensity of anxiety so that daily functioning is not impaired Objective: Client will develop appropriate relaxation and diversion activities to decrease level of anxiety. Intervention :Clinician will assist in developing awareness of cognitive messages that reinforce hopelessness and helplessness Intervention: Clinician will utilize logic and reality based thinking to challenge each unhealthy/negative thought for accuracy, replacing it with a positive, accurate thought Intervention: Clinician will teach Client various methods of stress reduction (medications, deep breathing, etc.) and assist in implementing these into daily life [Acceptance and Commitment Therapy] : Acceptance and Commitment Therapy  [Cognitive and/or Behavior Therapy] : Cognitive and/or Behavior Therapy  [Psychodynamic Therapy] : Psychodynamic Therapy  [Psychoeducation] : Psychoeducation  [Skills training (all types)] : Skills training (all types)  [Supportive Therapy] : Supportive Therapy [de-identified] : Client presented as oriented x and with stable mood. Session focus on finding motivation and working through negative self-talk that prevents client from achieving goals. CBT utilized to re-frame negative self talk and integrate healthier self-talk. Strengths perspective utilized to help client see strengths and propel self towards goals. Further work in this area to continue.  Session was in-person. [FreeTextEntry1] : Client to attend weekly sessions to promote stable mental health and reach desired functioning for self.   EMDR Tx plan created and CARLA Screening completed NC: I am not good enough 1. High school-friend were going to college "I didn't go to do anything and prepare" 2. Body and not in good shape. 3. Middle school-pushed along, didn't fail, just felt left behind and always catching up (reports this is when panic attacks started)  Present- Fear of constantly procrastinating, and missing out on life  Future- Die early, or I will never do anything even if I live a long life.  Container excercise and safe place excercise completed.

## 2024-07-28 NOTE — REASON FOR VISIT
[Patient preference] : as per patient preference [Telehealth (audio & video) - Individual/Group] : This visit was provided via telehealth using real-time 2-way audio visual technology. [Verbal consent obtained from patient/other participant(s)] : Verbal consent for telehealth/telephonic services obtained from patient/other participant(s) [Patient] : Patient

## 2024-07-29 ENCOUNTER — APPOINTMENT (OUTPATIENT)
Dept: PSYCHIATRY | Facility: CLINIC | Age: 44
End: 2024-07-29
Payer: COMMERCIAL

## 2024-07-29 DIAGNOSIS — Z79.899 OTHER LONG TERM (CURRENT) DRUG THERAPY: ICD-10-CM

## 2024-07-29 DIAGNOSIS — F13.20 SEDATIVE, HYPNOTIC OR ANXIOLYTIC DEPENDENCE, UNCOMPLICATED: ICD-10-CM

## 2024-07-29 DIAGNOSIS — F41.1 GENERALIZED ANXIETY DISORDER: ICD-10-CM

## 2024-07-29 DIAGNOSIS — F40.01 AGORAPHOBIA WITH PANIC DISORDER: ICD-10-CM

## 2024-07-29 DIAGNOSIS — F32.A DEPRESSION, UNSPECIFIED: ICD-10-CM

## 2024-07-29 PROCEDURE — G2211 COMPLEX E/M VISIT ADD ON: CPT | Mod: 95

## 2024-07-29 PROCEDURE — 90837 PSYTX W PT 60 MINUTES: CPT

## 2024-07-29 PROCEDURE — 99214 OFFICE O/P EST MOD 30 MIN: CPT | Mod: 95

## 2024-07-29 RX ORDER — AMOXICILLIN 875 MG/1
875 TABLET, FILM COATED ORAL
Qty: 20 | Refills: 0 | Status: DISCONTINUED | COMMUNITY
Start: 2024-03-14

## 2024-07-30 NOTE — PHYSICAL EXAM
[None] : none [Average] : average [Cooperative] : cooperative [Full] : full [Clear] : clear [Linear/Goal Directed] : linear/goal directed [None Reported] : none reported [WNL] : within normal limits

## 2024-07-30 NOTE — PLAN
[Medication education provided] : Medication education provided. [Rationale for medication choices, possible risks/precautions, benefits, alternative treatment choices, and consequences of non-treatment discussed] : Rationale for medication choices, possible risks/precautions, benefits, alternative treatment choices, and consequences of non-treatment discussed with patient/family/caregiver  [FreeTextEntry2] : Goal: Client will reduce overall level, frequency and intensity of anxiety so that daily functioning is not impaired Objective: Client will develop appropriate relaxation and diversion activities to decrease level of anxiety. Intervention :Clinician will assist in developing awareness of cognitive messages that reinforce hopelessness and helplessness Intervention: Clinician will utilize logic and reality based thinking to challenge each unhealthy/negative thought for accuracy, replacing it with a positive, accurate thought Intervention: Clinician will teach Client various methods of stress reduction (medications, deep breathing, etc.) and assist in implementing these into daily life [Acceptance and Commitment Therapy] : Acceptance and Commitment Therapy  [Cognitive and/or Behavior Therapy] : Cognitive and/or Behavior Therapy  [Psychodynamic Therapy] : Psychodynamic Therapy  [Psychoeducation] : Psychoeducation  [Skills training (all types)] : Skills training (all types)  [Supportive Therapy] : Supportive Therapy [de-identified] : Client presented as oriented x and with stable mood. Session focus around anxiety and panic attacks. Supportive reflection and supportive therapy provided with re-framing. Healthy coping skills introduced as well as grounding techniques. client responded well to same and mindfulness excercise seemed to benefit client. Client receptive to more mindfulness work.  Session was in-person. [FreeTextEntry1] : Client to attend weekly sessions to promote stable mental health and reach desired functioning for self.   EMDR Tx plan created and CARLA Screening completed NC: I am not good enough 1. High school-friend were going to college "I didn't go to do anything and prepare" 2. Body and not in good shape. 3. Middle school-pushed along, didn't fail, just felt left behind and always catching up (reports this is when panic attacks started)  Present- Fear of constantly procrastinating, and missing out on life  Future- Die early, or I will never do anything even if I live a long life.  Container excercise and safe place excercise completed.

## 2024-08-04 NOTE — PLAN
[FreeTextEntry5] : Psychoeducation and supportive therapy provided and recommended med changes   Medication: Changing time of meds to reduce daytime sedation and consider lowering the dose.  Take Xanax 0.75 mg AM and 1 mg afternoon and 1 mg HS, may consider lowering AM dose to 0.5 mg continue Gabapentin 200 mg at 4 PM and 100 mg 9 PM and Lexapro 20 mg/day- move Lexapro to PM Educated patient of importance of remaining abstinent from drugs and alcohol.  Emergency procedures were discussed: pt. educated to call 911 or go to nearest ER for worsening of symptoms/suicidal/homicidal ideation.  Continue individual psychotherapy  RTC in 6 weeks or earlier as needed.  Patient expressed understanding and agreement with above plan.   I stop checked today: Reference #: [id number]  Practitioner Count: 1 Pharmacy Count: 1 Current Opioid Prescriptions: 0 Current Benzodiazepine Prescriptions: 3 Current Stimulant Prescriptions: 0   Patient Demographic Information (PDI)     PDI	First Name	Last Name	Birth Date	Gender	Street Address	[facility]	Zip Code [place]	1980	Male	[address]	[facility]	[address]  Prescription Information    PDI Filter:   PDI	My Rx	Current Rx	Drug Type	Rx Written	Rx Dispensed	Drug	Quantity	Days Supply	Prescriber Name	Prescriber BRITTANY #	Payment Method	Dispenser A	Y	Y	B	06/12/2024	06/29/2024	alprazolam 0.5 mg tablet	30	30	Eula Dillon	[id number]	Insurance	[id number] A	Y	Y	B	06/12/2024	06/29/2024	alprazolam 1 mg tablet	60	30	DeviEula paz	[id number]	Insurance	[id number] A	Y	Y	B	06/12/2024	06/29/2024	alprazolam 0.25 mg tablet	30	30	Eula Dillon	[id number]	Insurance	[id number] A	Y	N	B	05/30/2024	05/31/2024	alprazolam 0.25 mg tablet	30	30	Eula Dillon	[id number]	Insurance	[id number] A	Y	N	B	05/30/2024	05/31/2024	alprazolam 1 mg tablet	60	30	DeviEula paz	[id number]	Insurance	[id number] A	Y	N	B	05/30/2024	05/31/2024	alprazolam 0.5 mg tablet	30	30	Devineni, Eula	[id number]	Insurance	[id number] A	Y	N	B	04/30/2024	05/02/2024	alprazolam 1 mg tablet	60	30	Devineni, Eula	[id number]	Insurance	[id number] A	Y	N	B	04/30/2024	05/02/2024	alprazolam 0.5 mg tablet	30	30	Devineni, [facility]	[id number]	Insurance	[id number] A	Y	N	B	04/30/2024	05/02/2024	alprazolam 0.25 mg tablet	30	30	Devineni, Eula	[id number]	Insurance	[id number] A	N	N	B	04/02/2024	04/03/2024	alprazolam 0.5 mg tablet	30	30	Treitel, Arya	[id number]	Insurance	[id number] A	N	N	B	04/02/2024	04/03/2024	alprazolam 1 mg tablet	60	30	Treitel, Arya	[id number]	Insurance	[id number] A	N	N	B	04/02/2024	04/03/2024	alprazolam 0.25 mg tablet	30	30	Treitel, Arya	[id number]	Insurance	[id number] A	Y	N	B	02/28/2024	03/04/2024	alprazolam 0.5 mg tablet	30	30	Devineni, Eula	[id number]	Insurance	[id number] A	Y	N	B	02/28/2024	03/04/2024	alprazolam 0.25 mg tablet	30	30	Devineni, Eula	[id number]	Insurance	[id number] A	Y	N	B	02/28/2024	03/04/2024	alprazolam 1 mg tablet	60	30	Devineni, Eula	[id number]	Insurance	[id number] A	Y	N	B	02/02/2024	02/03/2024	alprazolam 0.25 mg tablet	30	30	Devineni, Eula	[id number]	Insurance	[id number] A	Y	N	B	02/02/2024	02/03/2024	alprazolam 0.5 mg tablet	30	30	Devineni, Eula	[id number]	Insurance	[id number] A	Y	N	B	02/02/2024	02/03/2024	alprazolam 1 mg tablet	60	30	Devineni, Eula	[id number]	Insurance	[id number] A	Y	N	B	01/02/2024	01/05/2024	alprazolam 0.25 mg tablet	30	30	Devineni, Eula	[id number]	Insurance	[id number] A	Y	N	B	01/02/2024	01/05/2024	alprazolam 1 mg tablet	60	30	Devineni, Eula	[id number]	Insurance	[id number] A	Y	N	B	01/02/2024	01/02/2024	alprazolam 0.5 mg tablet	30	30	Devineni, Eula	[id number]	Insurance	[id number] A	Y	N	B	11/28/2023	12/06/2023	alprazolam 1 mg tablet	60	30	Devineni, Eula	[id number]	Insurance	[facility] [id number] A	Y	N	B	11/28/2023	12/06/2023	alprazolam 0.25 mg tablet	30	30	Devineni, Eula	[id number]	Insurance	[facility] [id number] A	Y	N	B	11/28/2023	11/29/2023	alprazolam 0.5 mg tablet	30	30	Devineni, Eula	[id number]	Insurance	[facility] [id number] A	Y	N	B	10/23/2023	11/08/2023	alprazolam 1 mg tablet	60	30	Devineni, Eula	[id number]	Insurance	[facility] [id number] A	Y	N	B	10/23/2023	11/08/2023	alprazolam 0.25 mg tablet	30	30	Devineni, Eula	[id number]	Insurance	[facility] [id number] A	Y	N	B	10/05/2023	10/05/2023	alprazolam 0.25 mg tablet	30	30	Devineni, Eula	[id number]	Insurance	[facility] [id number] A	Y	N	B	10/05/2023	10/05/2023	alprazolam 1 mg tablet	60	30	Devineni, Eula	[id number]	Insurance	[facility] [id number] A	Y	N	B	09/05/2023	09/06/2023	alprazolam 1 mg tablet	60	30	Eula Dillon	[id number]	Insurance	[facility] [id number] A	Y	N	B	09/05/2023	09/06/2023	alprazolam 0.5 mg tablet	30	30	Eula Dillon	[id number]	Insurance	[facility] [id number] A	Y	N	B	09/05/2023	09/06/2023	alprazolam 0.25 mg tablet	30	30	Eula Dillon	[id number]	Insurance	[facility] [id number] A	Y	N	B	08/01/2023	08/04/2023	alprazolam 1 mg tablet	60	30	Eula Dillon	[id number]	Insurance	[facility] [id number] A	Y	N	B	08/01/2023	08/04/2023	alprazolam 0.5 mg tablet	30	30	Eula Dillon	[id number]	Insurance	[facility] [id number] A	Y	N	B	08/01/2023	08/04/2023	alprazolam 0.25 mg tablet	30	30	Eula Dillon	[id number]	Insurance	[facility] [id number]

## 2024-08-04 NOTE — DISCUSSION/SUMMARY
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 He reports he is on Lexapro 10 mg/day for past 2 years and when he initially started taking Lexapro he noticed a slight reduction of anxiety.  He wants to come off Xanax, but is afraid or withdrawal sx and worsening anxiety   12/13/19: Patient reports he is about the same as last visit, and he tried to lower Xanax dose but not successful, but after education about slow taper and using Gabapentin to help with tapering he states he does want to go ahead with tapering off Xanax, and states he feels comfortable with plan to use Gabapentin to help with taper.  1/8/2020: Patient did not start tapering Xanax as planned, reports he was sick and also more stressed at work, and did not want to start new med along with antibiotics. 2/4/2020: Patient is taking Xanax 3.5 mg/day for past 2 weeks, and taking gabapentin 100 mg BID with good effect, but a bit apprehensive to cut down Xanax dose further at this time.   2/25/2020: Patient reports he feels more anxious when he does not have to work because he does not have a daily routine and also situation at home on some days make him anxious. He states he wants to lower Xanax, but when he feels stressed, even if not sever he takes Xanax to help cope with the situation.  3/24/2020: Patient reports he is managing anxiety and Xanax use better and has used gabapentin as needed for anxiety a few times, in addition to 4 PM and HS dose.  He states he likes weekly Rx and would like to continue the same  Considering he is doing well with Xanax dose reduction, plan to further taper Xanax dose.  6/2/2020: patient states he is doing okay, though has not taken more than rxed Xanax he states he is "struggling", i.e., eagerly waiting for his 4 PM dose; denies depression and denies panic attacks.  7/14/2020: Patient states he is taking Gabapentin as recommended and feels he is able to manage anxiety better and wants to continue with current regimen for another month before attempting to further taper Xanax  10/7/2020: Patient states he feels anxiety and depression sx are stable, wants to continue with current regimen and gradually try on his own to reduce Xanax dose to 3 mg/day.  11/25/2020: Patient remains stable, feels anxious, but agrees he would like to reduce Xanax dose and agrees to a trial of lowering dose.  1/6/2021: Patient was able to reduce Xanax dose down to 1 mg TID, feels gabapentin is helping.  2/17/2021: Patient remains stable with current dose of meds, and coping well with his anxiety and psychological dependence on Xanax 5/3/2021: Patient continues to remain stable, coping well with the recent stressors, adhering to the prescribed medication regimen. 6/14/2021: Patient reports he is continuing to feel stable and feels current medication regimen is helping with anxiety and denies feeling depressed. 08/09/021:  Patient continues to remain stable,and adhering to rxed meds, he was able to gradually taper Xanax from 1 mg QID to 1 mg TID, but as previously anxious and worried about further reduction in dose.   09/14/2021: Patient overall remained stable but reports increase in anxiety related to work stressors which he appears to be coping effectively and feels that the current medication regimen is working better for him and he does not feel ready to further cut down Xanax at this time.  11/15/2021: Patient reports change in work assignment has helped reduce stress and states current meds helping keep his anxiety and depression sx in good control.   1/11/2022: Patient reports he is doing well, anxiety sx in good control and no depression. Patient states he feels he is ready to start reducing Xanax and would like to try on his own to reduce the dose, slowly.   2/09/2022: Patient states he tried to lower Xanax dose to 0.75 mg in AM, but could not do it with cutting Xanax 0.5 mg tab and when he lowered it to 0.5 mg he felt anxious and continued taking 1 mg TID instead. He states he however does want to try lowering Xanax and wants to try a slower taper and wants to take 0.75 mg= 0.5+0.25 mg tabs. Informed patient that when he is due for next Xanax Rx will lower the dose and send 2 weeks Rx to monitor and if tolerating will continue it for a month before next dose reduction   4/26/2022: Patient reports feeling worried lowering his afternoon dose of Xanax, however, no withdrawal sx and wants to continue to keep current dose of Xanax.   6/8/2022: Patient continues to remain stable, anxiety in good control, no depression.   7/20/2022: Patient remains stable and reports anxiety and depression sx in gfood control. Patient reports that he had tried to skip the Xanax 0.25 mg dose a couple of times a week, but he worries if he may have adverse effects, or have withdrawal like seizures and states he realizes it may be more psychological dependence that he has, and will continue to try reducing the dose slowly.   8/17/2022: Patient continues to remain stable reports no symptoms of depression and anxiety symptoms are fairly controlled with the current medication regimen, he is however reluctant to further go down on the Xanax and at this point has not felt the need or is taking more than prescribed.  12/2/2022: Patient overall continues to remain stable with no symptoms of depression and anxiety symptoms fairly good control, slight increase in anxiety stress related to work but feels that the current doses of medications both Xanax and gabapentin are helping and he has not attempted to lower the Xanax dose and would like to continue the same dose at this time.   3/27/2023: Patient is taking same amount of Xanax/day, but taking less dose in the day time, and states he uses Xanax "as a crutch" to avoid feeling anxious when he is thinking about things in his life that he could change and wants to, but avoids thinking and or doing anything about it because he feels overwhelmed when these thoughts come to him when he is alone in the evening at home.   8/1/2023: Paient continues to remains stable, and states some days he is feeling a little more tried after taking AM Xanax, but is concerned about lowering the dose overall, but agrees to [facility] dosing schedule to take lower dose in AM and monitor if he could consistently lower the dose.   10/23/2023: Patient reports increased stress at work and had a panic attack at work hopes get help from work.    12/11/2023: Patient is doing better, able to cope with stress better, and no panic attacks since last visit.   3/26/2024: Patient reported an increase in anxiety symptoms and also experiencing panic attacks after recent death in the family trip triggered an increase in anxiety symptoms and would beenfit from increasing gabapentin to help with anxiety  4/30/2024: The patient reported improved adherence with gabapentin in PM and learning to cope with cousins' death by proactively taking care of his own health issues.   6/12/2024: The patient is reporting much reduced anxiety since last visit and no panic attacks, good mood, not depressed.   7/29/2024: Patient states though he is not having panic attacks, and generally less anxious, still worries about his health and feeling tired in the day after taking meds despite sleeping well at night.

## 2024-08-04 NOTE — REASON FOR VISIT
[Medical Office: ([facility])___] : The provider was located at the medical office in [unfilled]. [Home] : The patient, [unfilled], was located at home, [unfilled], at the time of the visit. [FreeTextEntry1] : depression and anxiety

## 2024-08-04 NOTE — HISTORY OF PRESENT ILLNESS
[FreeTextEntry1] : The patient states he is not working this summer. States he still continues to feel anxious about not being healthy, states not having a structure and wok to distract him has been stressful. He states he is finding he is irritated quickly at home. He reported panic attacks have reduced.   He reported sleep and appetite: good.  No panic attacks since the last visit.  He denied pervasive sad mood and or anhedonia. Denied feeling hopeless and or helpless and denied passive/active SI  He states he is feeling sleepy and tired after taking Xanax in the day. He also takes Lexapro in the day.  Patient denied any alcohol use, any substance abuse. No new medical issues, no new medication since last visit

## 2024-08-04 NOTE — DISCUSSION/SUMMARY
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 He reports he is on Lexapro 10 mg/day for past 2 years and when he initially started taking Lexapro he noticed a slight reduction of anxiety.  He wants to come off Xanax, but is afraid or withdrawal sx and worsening anxiety   12/13/19: Patient reports he is about the same as last visit, and he tried to lower Xanax dose but not successful, but after education about slow taper and using Gabapentin to help with tapering he states he does want to go ahead with tapering off Xanax, and states he feels comfortable with plan to use Gabapentin to help with taper.  1/8/2020: Patient did not start tapering Xanax as planned, reports he was sick and also more stressed at work, and did not want to start new med along with antibiotics. 2/4/2020: Patient is taking Xanax 3.5 mg/day for past 2 weeks, and taking gabapentin 100 mg BID with good effect, but a bit apprehensive to cut down Xanax dose further at this time.   2/25/2020: Patient reports he feels more anxious when he does not have to work because he does not have a daily routine and also situation at home on some days make him anxious. He states he wants to lower Xanax, but when he feels stressed, even if not sever he takes Xanax to help cope with the situation.  3/24/2020: Patient reports he is managing anxiety and Xanax use better and has used gabapentin as needed for anxiety a few times, in addition to 4 PM and HS dose.  He states he likes weekly Rx and would like to continue the same  Considering he is doing well with Xanax dose reduction, plan to further taper Xanax dose.  6/2/2020: patient states he is doing okay, though has not taken more than rxed Xanax he states he is "struggling", i.e., eagerly waiting for his 4 PM dose; denies depression and denies panic attacks.  7/14/2020: Patient states he is taking Gabapentin as recommended and feels he is able to manage anxiety better and wants to continue with current regimen for another month before attempting to further taper Xanax  10/7/2020: Patient states he feels anxiety and depression sx are stable, wants to continue with current regimen and gradually try on his own to reduce Xanax dose to 3 mg/day.  11/25/2020: Patient remains stable, feels anxious, but agrees he would like to reduce Xanax dose and agrees to a trial of lowering dose.  1/6/2021: Patient was able to reduce Xanax dose down to 1 mg TID, feels gabapentin is helping.  2/17/2021: Patient remains stable with current dose of meds, and coping well with his anxiety and psychological dependence on Xanax 5/3/2021: Patient continues to remain stable, coping well with the recent stressors, adhering to the prescribed medication regimen. 6/14/2021: Patient reports he is continuing to feel stable and feels current medication regimen is helping with anxiety and denies feeling depressed. 08/09/021:  Patient continues to remain stable,and adhering to rxed meds, he was able to gradually taper Xanax from 1 mg QID to 1 mg TID, but as previously anxious and worried about further reduction in dose.   09/14/2021: Patient overall remained stable but reports increase in anxiety related to work stressors which he appears to be coping effectively and feels that the current medication regimen is working better for him and he does not feel ready to further cut down Xanax at this time.  11/15/2021: Patient reports change in work assignment has helped reduce stress and states current meds helping keep his anxiety and depression sx in good control.   1/11/2022: Patient reports he is doing well, anxiety sx in good control and no depression. Patient states he feels he is ready to start reducing Xanax and would like to try on his own to reduce the dose, slowly.   2/09/2022: Patient states he tried to lower Xanax dose to 0.75 mg in AM, but could not do it with cutting Xanax 0.5 mg tab and when he lowered it to 0.5 mg he felt anxious and continued taking 1 mg TID instead. He states he however does want to try lowering Xanax and wants to try a slower taper and wants to take 0.75 mg= 0.5+0.25 mg tabs. Informed patient that when he is due for next Xanax Rx will lower the dose and send 2 weeks Rx to monitor and if tolerating will continue it for a month before next dose reduction   4/26/2022: Patient reports feeling worried lowering his afternoon dose of Xanax, however, no withdrawal sx and wants to continue to keep current dose of Xanax.   6/8/2022: Patient continues to remain stable, anxiety in good control, no depression.   7/20/2022: Patient remains stable and reports anxiety and depression sx in gfood control. Patient reports that he had tried to skip the Xanax 0.25 mg dose a couple of times a week, but he worries if he may have adverse effects, or have withdrawal like seizures and states he realizes it may be more psychological dependence that he has, and will continue to try reducing the dose slowly.   8/17/2022: Patient continues to remain stable reports no symptoms of depression and anxiety symptoms are fairly controlled with the current medication regimen, he is however reluctant to further go down on the Xanax and at this point has not felt the need or is taking more than prescribed.  12/2/2022: Patient overall continues to remain stable with no symptoms of depression and anxiety symptoms fairly good control, slight increase in anxiety stress related to work but feels that the current doses of medications both Xanax and gabapentin are helping and he has not attempted to lower the Xanax dose and would like to continue the same dose at this time.   3/27/2023: Patient is taking same amount of Xanax/day, but taking less dose in the day time, and states he uses Xanax "as a crutch" to avoid feeling anxious when he is thinking about things in his life that he could change and wants to, but avoids thinking and or doing anything about it because he feels overwhelmed when these thoughts come to him when he is alone in the evening at home.   8/1/2023: Paient continues to remains stable, and states some days he is feeling a little more tried after taking AM Xanax, but is concerned about lowering the dose overall, but agrees to [facility] dosing schedule to take lower dose in AM and monitor if he could consistently lower the dose.   10/23/2023: Patient reports increased stress at work and had a panic attack at work hopes get help from work.    12/11/2023: Patient is doing better, able to cope with stress better, and no panic attacks since last visit.   3/26/2024: Patient reported an increase in anxiety symptoms and also experiencing panic attacks after recent death in the family trip triggered an increase in anxiety symptoms and would beenfit from increasing gabapentin to help with anxiety  4/30/2024: The patient reported improved adherence with gabapentin in PM and learning to cope with cousins' death by proactively taking care of his own health issues.   6/12/2024: The patient is reporting much reduced anxiety since last visit and no panic attacks, good mood, not depressed.   7/29/2024: Patient states though he is not having panic attacks, and generally less anxious, still worries about his health and feeling tired in the day after taking meds despite sleeping well at night.

## 2024-08-04 NOTE — REASON FOR VISIT
[Medical Office: ([facility])___] : The provider was located at the medical office in [unfilled]. [Home] : The patient, [unfilled], was located at home, [unfilled], at the time of the visit. [FreeTextEntry1] : depression and anxiety

## 2024-08-04 NOTE — PLAN
[FreeTextEntry5] : Psychoeducation and supportive therapy provided and recommended med changes   Medication: Changing time of meds to reduce daytime sedation and consider lowering the dose.  Take Xanax 0.75 mg AM and 1 mg afternoon and 1 mg HS, may consider lowering AM dose to 0.5 mg continue Gabapentin 200 mg at 4 PM and 100 mg 9 PM and Lexapro 20 mg/day- move Lexapro to PM Educated patient of importance of remaining abstinent from drugs and alcohol.  Emergency procedures were discussed: pt. educated to call 911 or go to nearest ER for worsening of symptoms/suicidal/homicidal ideation.  Continue individual psychotherapy  RTC in 6 weeks or earlier as needed.  Patient expressed understanding and agreement with above plan.   I stop checked today: Reference #: [id number]  Practitioner Count: 1 Pharmacy Count: 1 Current Opioid Prescriptions: 0 Current Benzodiazepine Prescriptions: 3 Current Stimulant Prescriptions: 0   Patient Demographic Information (PDI)     PDI	First Name	Last Name	Birth Date	Gender	Street Address	[facility]	Zip Code [place]	1980	Male	[address]	[facility]	[address]  Prescription Information    PDI Filter:   PDI	My Rx	Current Rx	Drug Type	Rx Written	Rx Dispensed	Drug	Quantity	Days Supply	Prescriber Name	Prescriber BRITTANY #	Payment Method	Dispenser A	Y	Y	B	06/12/2024	06/29/2024	alprazolam 0.5 mg tablet	30	30	Eula Dillon	[id number]	Insurance	[id number] A	Y	Y	B	06/12/2024	06/29/2024	alprazolam 1 mg tablet	60	30	DeviEula paz	[id number]	Insurance	[id number] A	Y	Y	B	06/12/2024	06/29/2024	alprazolam 0.25 mg tablet	30	30	Eula Dillon	[id number]	Insurance	[id number] A	Y	N	B	05/30/2024	05/31/2024	alprazolam 0.25 mg tablet	30	30	Eula Dillon	[id number]	Insurance	[id number] A	Y	N	B	05/30/2024	05/31/2024	alprazolam 1 mg tablet	60	30	DeviEula paz	[id number]	Insurance	[id number] A	Y	N	B	05/30/2024	05/31/2024	alprazolam 0.5 mg tablet	30	30	Devineni, Eula	[id number]	Insurance	[id number] A	Y	N	B	04/30/2024	05/02/2024	alprazolam 1 mg tablet	60	30	Devineni, Eula	[id number]	Insurance	[id number] A	Y	N	B	04/30/2024	05/02/2024	alprazolam 0.5 mg tablet	30	30	Devineni, [facility]	[id number]	Insurance	[id number] A	Y	N	B	04/30/2024	05/02/2024	alprazolam 0.25 mg tablet	30	30	Devineni, Eula	[id number]	Insurance	[id number] A	N	N	B	04/02/2024	04/03/2024	alprazolam 0.5 mg tablet	30	30	Treitel, Arya	[id number]	Insurance	[id number] A	N	N	B	04/02/2024	04/03/2024	alprazolam 1 mg tablet	60	30	Treitel, Arya	[id number]	Insurance	[id number] A	N	N	B	04/02/2024	04/03/2024	alprazolam 0.25 mg tablet	30	30	Treitel, Arya	[id number]	Insurance	[id number] A	Y	N	B	02/28/2024	03/04/2024	alprazolam 0.5 mg tablet	30	30	Devineni, Eula	[id number]	Insurance	[id number] A	Y	N	B	02/28/2024	03/04/2024	alprazolam 0.25 mg tablet	30	30	Devineni, Eula	[id number]	Insurance	[id number] A	Y	N	B	02/28/2024	03/04/2024	alprazolam 1 mg tablet	60	30	Devineni, Eula	[id number]	Insurance	[id number] A	Y	N	B	02/02/2024	02/03/2024	alprazolam 0.25 mg tablet	30	30	Devineni, Eula	[id number]	Insurance	[id number] A	Y	N	B	02/02/2024	02/03/2024	alprazolam 0.5 mg tablet	30	30	Devineni, Eula	[id number]	Insurance	[id number] A	Y	N	B	02/02/2024	02/03/2024	alprazolam 1 mg tablet	60	30	Devineni, Eula	[id number]	Insurance	[id number] A	Y	N	B	01/02/2024	01/05/2024	alprazolam 0.25 mg tablet	30	30	Devineni, Eula	[id number]	Insurance	[id number] A	Y	N	B	01/02/2024	01/05/2024	alprazolam 1 mg tablet	60	30	Devineni, Eula	[id number]	Insurance	[id number] A	Y	N	B	01/02/2024	01/02/2024	alprazolam 0.5 mg tablet	30	30	Devineni, Eula	[id number]	Insurance	[id number] A	Y	N	B	11/28/2023	12/06/2023	alprazolam 1 mg tablet	60	30	Devineni, Eula	[id number]	Insurance	[facility] [id number] A	Y	N	B	11/28/2023	12/06/2023	alprazolam 0.25 mg tablet	30	30	Devineni, Eula	[id number]	Insurance	[facility] [id number] A	Y	N	B	11/28/2023	11/29/2023	alprazolam 0.5 mg tablet	30	30	Devineni, Eula	[id number]	Insurance	[facility] [id number] A	Y	N	B	10/23/2023	11/08/2023	alprazolam 1 mg tablet	60	30	Devineni, Eula	[id number]	Insurance	[facility] [id number] A	Y	N	B	10/23/2023	11/08/2023	alprazolam 0.25 mg tablet	30	30	Devineni, Eula	[id number]	Insurance	[facility] [id number] A	Y	N	B	10/05/2023	10/05/2023	alprazolam 0.25 mg tablet	30	30	Devineni, Eula	[id number]	Insurance	[facility] [id number] A	Y	N	B	10/05/2023	10/05/2023	alprazolam 1 mg tablet	60	30	Devineni, Eula	[id number]	Insurance	[facility] [id number] A	Y	N	B	09/05/2023	09/06/2023	alprazolam 1 mg tablet	60	30	Eula Dillon	[id number]	Insurance	[facility] [id number] A	Y	N	B	09/05/2023	09/06/2023	alprazolam 0.5 mg tablet	30	30	Eula Dillon	[id number]	Insurance	[facility] [id number] A	Y	N	B	09/05/2023	09/06/2023	alprazolam 0.25 mg tablet	30	30	Eula Dillon	[id number]	Insurance	[facility] [id number] A	Y	N	B	08/01/2023	08/04/2023	alprazolam 1 mg tablet	60	30	Eula Dillon	[id number]	Insurance	[facility] [id number] A	Y	N	B	08/01/2023	08/04/2023	alprazolam 0.5 mg tablet	30	30	Eula Dillon	[id number]	Insurance	[facility] [id number] A	Y	N	B	08/01/2023	08/04/2023	alprazolam 0.25 mg tablet	30	30	Eula Dillon	[id number]	Insurance	[facility] [id number]

## 2024-08-05 ENCOUNTER — APPOINTMENT (OUTPATIENT)
Dept: PSYCHIATRY | Facility: CLINIC | Age: 44
End: 2024-08-05

## 2024-08-12 ENCOUNTER — APPOINTMENT (OUTPATIENT)
Dept: PSYCHIATRY | Facility: CLINIC | Age: 44
End: 2024-08-12
Payer: COMMERCIAL

## 2024-08-12 PROCEDURE — 90834 PSYTX W PT 45 MINUTES: CPT | Mod: 95

## 2024-08-12 NOTE — PLAN
[FreeTextEntry2] : Goal: Client will reduce overall level, frequency and intensity of anxiety so that daily functioning is not impaired Objective: Client will develop appropriate relaxation and diversion activities to decrease level of anxiety. Intervention :Clinician will assist in developing awareness of cognitive messages that reinforce hopelessness and helplessness Intervention: Clinician will utilize logic and reality based thinking to challenge each unhealthy/negative thought for accuracy, replacing it with a positive, accurate thought Intervention: Clinician will teach Client various methods of stress reduction (medications, deep breathing, etc.) and assist in implementing these into daily life [Acceptance and Commitment Therapy] : Acceptance and Commitment Therapy  [Cognitive and/or Behavior Therapy] : Cognitive and/or Behavior Therapy  [Psychodynamic Therapy] : Psychodynamic Therapy  [Psychoeducation] : Psychoeducation  [Skills training (all types)] : Skills training (all types)  [Supportive Therapy] : Supportive Therapy [de-identified] : Client presented as oriented x and with stable mood. Session focus on mindfulness and anxiety and learning and practicing emotional regulation techniques and healthier coping skills. Client responded well to same and was able to calm self and integrate positive self talk vs negative self-talk.  Session was virtual. [FreeTextEntry1] : Client to attend weekly sessions to promote stable mental health and reach desired functioning for self.   EMDR Tx plan created and CARLA Screening completed NC: I am not good enough 1. High school-friend were going to college "I didn't go to do anything and prepare" 2. Body and not in good shape. 3. Middle school-pushed along, didn't fail, just felt left behind and always catching up (reports this is when panic attacks started)  Present- Fear of constantly procrastinating, and missing out on life  Future- Die early, or I will never do anything even if I live a long life.  Container excercise and safe place excercise completed.

## 2024-08-19 ENCOUNTER — APPOINTMENT (OUTPATIENT)
Dept: PSYCHIATRY | Facility: CLINIC | Age: 44
End: 2024-08-19
Payer: COMMERCIAL

## 2024-08-19 PROCEDURE — 90834 PSYTX W PT 45 MINUTES: CPT

## 2024-08-21 NOTE — PLAN
[FreeTextEntry2] : Goal: Client will reduce overall level, frequency and intensity of anxiety so that daily functioning is not impaired Objective: Client will develop appropriate relaxation and diversion activities to decrease level of anxiety. Intervention :Clinician will assist in developing awareness of cognitive messages that reinforce hopelessness and helplessness Intervention: Clinician will utilize logic and reality based thinking to challenge each unhealthy/negative thought for accuracy, replacing it with a positive, accurate thought Intervention: Clinician will teach Client various methods of stress reduction (medications, deep breathing, etc.) and assist in implementing these into daily life [Acceptance and Commitment Therapy] : Acceptance and Commitment Therapy  [Cognitive and/or Behavior Therapy] : Cognitive and/or Behavior Therapy  [Psychodynamic Therapy] : Psychodynamic Therapy  [Psychoeducation] : Psychoeducation  [Skills training (all types)] : Skills training (all types)  [Supportive Therapy] : Supportive Therapy [de-identified] : Client presented as oriented x and with stable mood. Session focus xanax reduction and processing clients reasons to reduce xanax use. Client has also and continues to review this with psychiatrist. Safety education provided. Session focus on pros and cons of reducing xanax use and healthier coping skills to utilize to reduce panic attacks, healthy coping skills reviewed. Patient receptive to same and found session helpful overall.  Session was in-person. [FreeTextEntry1] : Client to attend weekly sessions to promote stable mental health and reach desired functioning for self.   EMDR Tx plan created and CARLA Screening completed NC: I am not good enough 1. High school-friend were going to college "I didn't go to do anything and prepare" 2. Body and not in good shape. 3. Middle school-pushed along, didn't fail, just felt left behind and always catching up (reports this is when panic attacks started)  Present- Fear of constantly procrastinating, and missing out on life  Future- Die early, or I will never do anything even if I live a long life.  Container excercise and safe place excercise completed.

## 2024-09-03 ENCOUNTER — APPOINTMENT (OUTPATIENT)
Dept: PSYCHIATRY | Facility: CLINIC | Age: 44
End: 2024-09-03
Payer: COMMERCIAL

## 2024-09-03 PROCEDURE — 90832 PSYTX W PT 30 MINUTES: CPT | Mod: 95

## 2024-09-04 NOTE — PLAN
[FreeTextEntry2] : Goal: Client will reduce overall level, frequency and intensity of anxiety so that daily functioning is not impaired Objective: Client will develop appropriate relaxation and diversion activities to decrease level of anxiety. Intervention :Clinician will assist in developing awareness of cognitive messages that reinforce hopelessness and helplessness Intervention: Clinician will utilize logic and reality based thinking to challenge each unhealthy/negative thought for accuracy, replacing it with a positive, accurate thought Intervention: Clinician will teach Client various methods of stress reduction (medications, deep breathing, etc.) and assist in implementing these into daily life [Acceptance and Commitment Therapy] : Acceptance and Commitment Therapy  [Cognitive and/or Behavior Therapy] : Cognitive and/or Behavior Therapy  [Psychodynamic Therapy] : Psychodynamic Therapy  [Psychoeducation] : Psychoeducation  [Skills training (all types)] : Skills training (all types)  [Supportive Therapy] : Supportive Therapy [de-identified] : Client presented as oriented x and with stable mood. Patient had Covid 19 so session was shorter today. Session focus on lack of motivation but desire for change. CBT utilized for re-framing to help client make best decisions for self and weighing out pros and cons to ensure same. Also focus was around catastrophic thinking and re-framing same and client tries to engage with changes for self. Further work in this area to continue. Pt responded well to same and remains in contemplation stage of change.  Session was virtual.  [FreeTextEntry1] : Client to attend weekly sessions to promote stable mental health and reach desired functioning for self.   EMDR Tx plan created and CARLA Screening completed NC: I am not good enough 1. High school-friend were going to college "I didn't go to do anything and prepare" 2. Body and not in good shape. 3. Middle school-pushed along, didn't fail, just felt left behind and always catching up (reports this is when panic attacks started)  Present- Fear of constantly procrastinating, and missing out on life  Future- Die early, or I will never do anything even if I live a long life.  Container excercise and safe place excercise completed.

## 2024-09-10 ENCOUNTER — APPOINTMENT (OUTPATIENT)
Dept: PSYCHIATRY | Facility: CLINIC | Age: 44
End: 2024-09-10
Payer: COMMERCIAL

## 2024-09-10 PROCEDURE — 90834 PSYTX W PT 45 MINUTES: CPT | Mod: 95

## 2024-09-11 ENCOUNTER — APPOINTMENT (OUTPATIENT)
Dept: PSYCHIATRY | Facility: CLINIC | Age: 44
End: 2024-09-11

## 2024-09-11 DIAGNOSIS — F41.1 GENERALIZED ANXIETY DISORDER: ICD-10-CM

## 2024-09-11 DIAGNOSIS — F40.01 AGORAPHOBIA WITH PANIC DISORDER: ICD-10-CM

## 2024-09-11 DIAGNOSIS — F32.A DEPRESSION, UNSPECIFIED: ICD-10-CM

## 2024-09-11 DIAGNOSIS — F13.20 SEDATIVE, HYPNOTIC OR ANXIOLYTIC DEPENDENCE, UNCOMPLICATED: ICD-10-CM

## 2024-09-11 PROCEDURE — G2211 COMPLEX E/M VISIT ADD ON: CPT | Mod: NC,95

## 2024-09-11 PROCEDURE — 99214 OFFICE O/P EST MOD 30 MIN: CPT | Mod: 95

## 2024-09-11 NOTE — PLAN
[FreeTextEntry2] : Goal: Client will reduce overall level, frequency and intensity of anxiety so that daily functioning is not impaired Objective: Client will develop appropriate relaxation and diversion activities to decrease level of anxiety. Intervention :Clinician will assist in developing awareness of cognitive messages that reinforce hopelessness and helplessness Intervention: Clinician will utilize logic and reality based thinking to challenge each unhealthy/negative thought for accuracy, replacing it with a positive, accurate thought Intervention: Clinician will teach Client various methods of stress reduction (medications, deep breathing, etc.) and assist in implementing these into daily life [Acceptance and Commitment Therapy] : Acceptance and Commitment Therapy  [Cognitive and/or Behavior Therapy] : Cognitive and/or Behavior Therapy  [Psychodynamic Therapy] : Psychodynamic Therapy  [Psychoeducation] : Psychoeducation  [Skills training (all types)] : Skills training (all types)  [Supportive Therapy] : Supportive Therapy [de-identified] : Client presented as oriented x and with stable mood. Lack of motivation was focus, and re-framing negative thoughts and rumination of difficult thoughts to help client grow and look beyond and take healthy risks. ACT utilized as well. Supportive therapy provided.  Session was virtual.  [FreeTextEntry1] : Client to attend weekly sessions to promote stable mental health and reach desired functioning for self.   EMDR Tx plan created and CARLA Screening completed NC: I am not good enough 1. High school-friend were going to college "I didn't go to do anything and prepare" 2. Body and not in good shape. 3. Middle school-pushed along, didn't fail, just felt left behind and always catching up (reports this is when panic attacks started)  Present- Fear of constantly procrastinating, and missing out on life  Future- Die early, or I will never do anything even if I live a long life.  Container excercise and safe place excercise completed.

## 2024-09-17 ENCOUNTER — APPOINTMENT (OUTPATIENT)
Dept: PSYCHIATRY | Facility: CLINIC | Age: 44
End: 2024-09-17

## 2024-09-21 NOTE — DISCUSSION/SUMMARY
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 He reports he is on Lexapro 10 mg/day for past 2 years and when he initially started taking Lexapro he noticed a slight reduction of anxiety.  He wants to come off Xanax, but is afraid or withdrawal sx and worsening anxiety   12/13/19: Patient reports he is about the same as last visit, and he tried to lower Xanax dose but not successful, but after education about slow taper and using Gabapentin to help with tapering he states he does want to go ahead with tapering off Xanax, and states he feels comfortable with plan to use Gabapentin to help with taper.  1/8/2020: Patient did not start tapering Xanax as planned, reports he was sick and also more stressed at work, and did not want to start new med along with antibiotics. 2/4/2020: Patient is taking Xanax 3.5 mg/day for past 2 weeks, and taking gabapentin 100 mg BID with good effect, but a bit apprehensive to cut down Xanax dose further at this time.   2/25/2020: Patient reports he feels more anxious when he does not have to work because he does not have a daily routine and also situation at home on some days make him anxious. He states he wants to lower Xanax, but when he feels stressed, even if not sever he takes Xanax to help cope with the situation.  3/24/2020: Patient reports he is managing anxiety and Xanax use better and has used gabapentin as needed for anxiety a few times, in addition to 4 PM and HS dose.  He states he likes weekly Rx and would like to continue the same  Considering he is doing well with Xanax dose reduction, plan to further taper Xanax dose.  6/2/2020: patient states he is doing okay, though has not taken more than rxed Xanax he states he is "struggling", i.e., eagerly waiting for his 4 PM dose; denies depression and denies panic attacks.  7/14/2020: Patient states he is taking Gabapentin as recommended and feels he is able to manage anxiety better and wants to continue with current regimen for another month before attempting to further taper Xanax  10/7/2020: Patient states he feels anxiety and depression sx are stable, wants to continue with current regimen and gradually try on his own to reduce Xanax dose to 3 mg/day.  11/25/2020: Patient remains stable, feels anxious, but agrees he would like to reduce Xanax dose and agrees to a trial of lowering dose.  1/6/2021: Patient was able to reduce Xanax dose down to 1 mg TID, feels gabapentin is helping.  2/17/2021: Patient remains stable with current dose of meds, and coping well with his anxiety and psychological dependence on Xanax 5/3/2021: Patient continues to remain stable, coping well with the recent stressors, adhering to the prescribed medication regimen. 6/14/2021: Patient reports he is continuing to feel stable and feels current medication regimen is helping with anxiety and denies feeling depressed. 08/09/021:  Patient continues to remain stable,and adhering to rxed meds, he was able to gradually taper Xanax from 1 mg QID to 1 mg TID, but as previously anxious and worried about further reduction in dose.   09/14/2021: Patient overall remained stable but reports increase in anxiety related to work stressors which he appears to be coping effectively and feels that the current medication regimen is working better for him and he does not feel ready to further cut down Xanax at this time.  11/15/2021: Patient reports change in work assignment has helped reduce stress and states current meds helping keep his anxiety and depression sx in good control.   1/11/2022: Patient reports he is doing well, anxiety sx in good control and no depression. Patient states he feels he is ready to start reducing Xanax and would like to try on his own to reduce the dose, slowly.   2/09/2022: Patient states he tried to lower Xanax dose to 0.75 mg in AM, but could not do it with cutting Xanax 0.5 mg tab and when he lowered it to 0.5 mg he felt anxious and continued taking 1 mg TID instead. He states he however does want to try lowering Xanax and wants to try a slower taper and wants to take 0.75 mg= 0.5+0.25 mg tabs. Informed patient that when he is due for next Xanax Rx will lower the dose and send 2 weeks Rx to monitor and if tolerating will continue it for a month before next dose reduction   4/26/2022: Patient reports feeling worried lowering his afternoon dose of Xanax, however, no withdrawal sx and wants to continue to keep current dose of Xanax.   6/8/2022: Patient continues to remain stable, anxiety in good control, no depression.   7/20/2022: Patient remains stable and reports anxiety and depression sx in gfood control. Patient reports that he had tried to skip the Xanax 0.25 mg dose a couple of times a week, but he worries if he may have adverse effects, or have withdrawal like seizures and states he realizes it may be more psychological dependence that he has, and will continue to try reducing the dose slowly.   8/17/2022: Patient continues to remain stable reports no symptoms of depression and anxiety symptoms are fairly controlled with the current medication regimen, he is however reluctant to further go down on the Xanax and at this point has not felt the need or is taking more than prescribed.  12/2/2022: Patient overall continues to remain stable with no symptoms of depression and anxiety symptoms fairly good control, slight increase in anxiety stress related to work but feels that the current doses of medications both Xanax and gabapentin are helping and he has not attempted to lower the Xanax dose and would like to continue the same dose at this time.   3/27/2023: Patient is taking same amount of Xanax/day, but taking less dose in the day time, and states he uses Xanax "as a crutch" to avoid feeling anxious when he is thinking about things in his life that he could change and wants to, but avoids thinking and or doing anything about it because he feels overwhelmed when these thoughts come to him when he is alone in the evening at home.   8/1/2023: Paient continues to remains stable, and states some days he is feeling a little more tried after taking AM Xanax, but is concerned about lowering the dose overall, but agrees to [facility] dosing schedule to take lower dose in AM and monitor if he could consistently lower the dose.   10/23/2023: Patient reports increased stress at work and had a panic attack at work hopes get help from work.    12/11/2023: Patient is doing better, able to cope with stress better, and no panic attacks since last visit.   3/26/2024: Patient reported an increase in anxiety symptoms and also experiencing panic attacks after recent death in the family trip triggered an increase in anxiety symptoms and would beenfit from increasing gabapentin to help with anxiety  4/30/2024: The patient reported improved adherence with gabapentin in PM and learning to cope with cousins' death by proactively taking care of his own health issues.   6/12/2024: The patient is reporting much reduced anxiety since last visit and no panic attacks, good mood, not depressed.   7/29/2024: Patient states though he is not having panic attacks, and generally less anxious, still worries about his health and feeling tired in the day after taking meds despite sleeping well at night.   09/11/2024: The patient states having daily structure, going back to work helping with not feeling tired in the day and switched back Lexapro to AM and he has not had panic attacks and anxiety symptoms are stable and wants to try to lower Xanax dose and if is able to do it consistently wants to stay on 0.5 mg in the afternoon.

## 2024-09-21 NOTE — REASON FOR VISIT
[Patient preference] : as per patient preference [Telehealth (audio & video) - Individual/Group] : This visit was provided via telehealth using real-time 2-way audio visual technology. [Other Location: e.g. Home (Enter Location, City,State)___] : The provider was located at [unfilled]. [Home] : The patient, [unfilled], was located at home, [unfilled], at the time of the visit. [Verbal consent obtained from patient/other participant(s)] : Verbal consent for telehealth/telephonic services obtained from patient/other participant(s) [Patient] : Patient [Patient's space is appropriate for telehealth and maintains privacy/confidentiality.] : Patient's space is appropriate for telehealth and maintains privacy/confidentiality. [Participant(s) identity verified] : Participant(s) identity verified. [FreeTextEntry1] : depression and anxiety

## 2024-09-21 NOTE — PLAN
[Medication education provided] : Medication education provided. [Rationale for medication choices, possible risks/precautions, benefits, alternative treatment choices, and consequences of non-treatment discussed] : Rationale for medication choices, possible risks/precautions, benefits, alternative treatment choices, and consequences of non-treatment discussed with patient/family/caregiver  [FreeTextEntry5] : Psychoeducation and supportive therapy provided and recommended med changes   Medication: Continue Xanax 1 mg AM and HS, and 0.75 mg in the afternoon. He may consider lowering the afternoon dose to 0.5 mg  AM and 1 mg afternoon and 1 mg HS may consider lowering AM dose to 0.5 mg, and keep 0.25 mg as needed.  continue Gabapentin 100 mg at 4 PM and 100 mg 9 PM and Lexapro 20 mg/day Educated patient of importance of remaining abstinent from drugs and alcohol.  Emergency procedures were discussed: pt. educated to call 911 or go to nearest ER for worsening of symptoms/suicidal/homicidal ideation.  Continue individual psychotherapy  RTC in 2.5 months or earlier as needed.  Patient expressed understanding and agreement with above plan.   I stop checked today: Reference #: [id number]  Practitioner Count: 1 Pharmacy Count: 1 Current Opioid Prescriptions: 0 Current Benzodiazepine Prescriptions: 3 Current Stimulant Prescriptions: 0  Patient Demographic Information (PDI)     PDI	First Name	Last Name	Birth Date	Gender	Street Address	City	State	Zip Code [place]	1980	Male	[address]	[facility]	[address]  Prescription Information  PDI	My Rx	Current Rx	Drug Type	Rx Written	Rx Dispensed	Drug	Quantity	Days Supply	Prescriber Name	Prescriber BRITTANY #	Payment Method	Dispenser A	Y	Y	B	08/29/2024	08/29/2024	alprazolam 0.5 mg tablet	30	30	Eula Dillon	[id number]	Insurance	[id number] A	Y	Y	B	08/29/2024	08/29/2024	alprazolam 1 mg tablet	60	30	DeviEula paz	[id number]	Insurance	[id number] A	Y	Y	B	08/29/2024	08/29/2024	alprazolam 0.25 mg tablet	30	30	DevineEula aguilar	[id number]	Insurance	[id number] A	Y	N	B	07/29/2024	07/29/2024	alprazolam 0.5 mg tablet	30	30	DeviEula paz	[id number]	Insurance	[id number] A	Y	N	B	07/29/2024	07/29/2024	alprazolam 1 mg tablet	60	30	DeviEula paz	[id number]	Insurance	[id number] A	Y	N	B	07/29/2024	07/29/2024	alprazolam 0.25 mg tablet	30	30	Devineni, Eula	[id number]	Insurance	[id number] A	Y	N	B	06/12/2024	06/29/2024	alprazolam 0.5 mg tablet	30	30	Devineni, Eula	[id number]	Insurance	[id number] A	Y	N	B	06/12/2024	06/29/2024	alprazolam 1 mg tablet	60	30	Devineni, Eula	[id number]	Insurance	[id number] A	Y	N	B	06/12/2024	06/29/2024	alprazolam 0.25 mg tablet	30	30	Devineni, Eula	[id number]	Insurance	[id number] A	Y	N	B	05/30/2024	05/31/2024	alprazolam 0.25 mg tablet	30	30	Devineni, Eula	[id number]	Insurance	[id number] A	Y	N	B	05/30/2024	05/31/2024	alprazolam 1 mg tablet	60	30	Devineni, Eula	[id number]	Insurance	[id number] A	Y	N	B	05/30/2024	05/31/2024	alprazolam 0.5 mg tablet	30	30	Devineni, Eula	[id number]	Insurance	[id number] A	Y	N	B	04/30/2024	05/02/2024	alprazolam 1 mg tablet	60	30	Devineni, Eula	[id number]	Insurance	[id number] A	Y	N	B	04/30/2024	05/02/2024	alprazolam 0.5 mg tablet	30	30	Devineni, Eula	[id number]	Insurance	[id number] A	Y	N	B	04/30/2024	05/02/2024	alprazolam 0.25 mg tablet	30	30	Devineni, Eual	[id number]	Insurance	[id number] A	N	N	B	04/02/2024	04/03/2024	alprazolam 0.5 mg tablet	30	30	TreiteArya rogers	[id number]	Insurance	[id number] A	N	N	B	04/02/2024	04/03/2024	alprazolam 1 mg tablet	60	30	Treitel, Arya	[id number]	Insurance	[id number] A	N	N	B	04/02/2024	04/03/2024	alprazolam 0.25 mg tablet	30	30	Treitel, Arya	[id number]	Insurance	[id number] A	Y	N	B	02/28/2024	03/04/2024	alprazolam 0.5 mg tablet	30	30	Devineni, Eula	[id number]	Insurance	[id number] A	Y	N	B	02/28/2024	03/04/2024	alprazolam 0.25 mg tablet	30	30	Devineni, Eula	[id number]	Insurance	[id number] A	Y	N	B	02/28/2024	03/04/2024	alprazolam 1 mg tablet	60	30	Devineni, Eula	[id number]	Insurance	[id number] A	Y	N	B	02/02/2024	02/03/2024	alprazolam 0.25 mg tablet	30	30	Devineni, Eula	[id number]	Insurance	[id number] A	Y	N	B	02/02/2024	02/03/2024	alprazolam 0.5 mg tablet	30	30	Devineni, Eula	[id number]	Insurance	[id number] A	Y	N	B	02/02/2024	02/03/2024	alprazolam 1 mg tablet	60	30	Devineni, Eula	[id number]	Insurance	[id number] A	Y	N	B	01/02/2024	01/05/2024	alprazolam 0.25 mg tablet	30	30	Devineni, Eula	[id number]	Insurance	[id number] A	Y	N	B	01/02/2024	01/05/2024	alprazolam 1 mg tablet	60	30	Devineni, Eula	[id number]	Insurance	[id number] A	Y	N	B	01/02/2024	01/02/2024	alprazolam 0.5 mg tablet	30	30	Devineni, Eula	[id number]	Insurance	[id number] A	Y	N	B	11/28/2023	12/06/2023	alprazolam 1 mg tablet	60	30	Eula Dillon	[id number]	Insurance	[facility] [id number] A	Y	N	B	11/28/2023	12/06/2023	alprazolam 0.25 mg tablet	30	30	Eula Dillon	[id number]	Insurance	[facility] [id number] A	Y	N	B	11/28/2023	11/29/2023	alprazolam 0.5 mg tablet	30	30	Eula Dillon	[id number]	Insurance	[facility] [id number] A	Y	N	B	10/23/2023	11/08/2023	alprazolam 1 mg tablet	60	30	Eula Dillon	[id number]	Insurance	[facility] [id number] A	Y	N	B	10/23/2023	11/08/2023	alprazolam 0.25 mg tablet	30	30	Eula Dillon	[id number]	Insurance	[facility] [id number] A	Y	N	B	10/05/2023	10/05/2023	alprazolam 0.25 mg tablet	30	30	Eula Dillon	[id number]	Insurance	[facility] [id number] A	Y	N	B	10/05/2023	10/05/2023	alprazolam 1 mg tablet	60	30	Eula Dillon	[id number]	Insurance	[facility] [id number]

## 2024-09-21 NOTE — DISCUSSION/SUMMARY
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 He reports he is on Lexapro 10 mg/day for past 2 years and when he initially started taking Lexapro he noticed a slight reduction of anxiety.  He wants to come off Xanax, but is afraid or withdrawal sx and worsening anxiety   12/13/19: Patient reports he is about the same as last visit, and he tried to lower Xanax dose but not successful, but after education about slow taper and using Gabapentin to help with tapering he states he does want to go ahead with tapering off Xanax, and states he feels comfortable with plan to use Gabapentin to help with taper.  1/8/2020: Patient did not start tapering Xanax as planned, reports he was sick and also more stressed at work, and did not want to start new med along with antibiotics. 2/4/2020: Patient is taking Xanax 3.5 mg/day for past 2 weeks, and taking gabapentin 100 mg BID with good effect, but a bit apprehensive to cut down Xanax dose further at this time.   2/25/2020: Patient reports he feels more anxious when he does not have to work because he does not have a daily routine and also situation at home on some days make him anxious. He states he wants to lower Xanax, but when he feels stressed, even if not sever he takes Xanax to help cope with the situation.  3/24/2020: Patient reports he is managing anxiety and Xanax use better and has used gabapentin as needed for anxiety a few times, in addition to 4 PM and HS dose.  He states he likes weekly Rx and would like to continue the same  Considering he is doing well with Xanax dose reduction, plan to further taper Xanax dose.  6/2/2020: patient states he is doing okay, though has not taken more than rxed Xanax he states he is "struggling", i.e., eagerly waiting for his 4 PM dose; denies depression and denies panic attacks.  7/14/2020: Patient states he is taking Gabapentin as recommended and feels he is able to manage anxiety better and wants to continue with current regimen for another month before attempting to further taper Xanax  10/7/2020: Patient states he feels anxiety and depression sx are stable, wants to continue with current regimen and gradually try on his own to reduce Xanax dose to 3 mg/day.  11/25/2020: Patient remains stable, feels anxious, but agrees he would like to reduce Xanax dose and agrees to a trial of lowering dose.  1/6/2021: Patient was able to reduce Xanax dose down to 1 mg TID, feels gabapentin is helping.  2/17/2021: Patient remains stable with current dose of meds, and coping well with his anxiety and psychological dependence on Xanax 5/3/2021: Patient continues to remain stable, coping well with the recent stressors, adhering to the prescribed medication regimen. 6/14/2021: Patient reports he is continuing to feel stable and feels current medication regimen is helping with anxiety and denies feeling depressed. 08/09/021:  Patient continues to remain stable,and adhering to rxed meds, he was able to gradually taper Xanax from 1 mg QID to 1 mg TID, but as previously anxious and worried about further reduction in dose.   09/14/2021: Patient overall remained stable but reports increase in anxiety related to work stressors which he appears to be coping effectively and feels that the current medication regimen is working better for him and he does not feel ready to further cut down Xanax at this time.  11/15/2021: Patient reports change in work assignment has helped reduce stress and states current meds helping keep his anxiety and depression sx in good control.   1/11/2022: Patient reports he is doing well, anxiety sx in good control and no depression. Patient states he feels he is ready to start reducing Xanax and would like to try on his own to reduce the dose, slowly.   2/09/2022: Patient states he tried to lower Xanax dose to 0.75 mg in AM, but could not do it with cutting Xanax 0.5 mg tab and when he lowered it to 0.5 mg he felt anxious and continued taking 1 mg TID instead. He states he however does want to try lowering Xanax and wants to try a slower taper and wants to take 0.75 mg= 0.5+0.25 mg tabs. Informed patient that when he is due for next Xanax Rx will lower the dose and send 2 weeks Rx to monitor and if tolerating will continue it for a month before next dose reduction   4/26/2022: Patient reports feeling worried lowering his afternoon dose of Xanax, however, no withdrawal sx and wants to continue to keep current dose of Xanax.   6/8/2022: Patient continues to remain stable, anxiety in good control, no depression.   7/20/2022: Patient remains stable and reports anxiety and depression sx in gfood control. Patient reports that he had tried to skip the Xanax 0.25 mg dose a couple of times a week, but he worries if he may have adverse effects, or have withdrawal like seizures and states he realizes it may be more psychological dependence that he has, and will continue to try reducing the dose slowly.   8/17/2022: Patient continues to remain stable reports no symptoms of depression and anxiety symptoms are fairly controlled with the current medication regimen, he is however reluctant to further go down on the Xanax and at this point has not felt the need or is taking more than prescribed.  12/2/2022: Patient overall continues to remain stable with no symptoms of depression and anxiety symptoms fairly good control, slight increase in anxiety stress related to work but feels that the current doses of medications both Xanax and gabapentin are helping and he has not attempted to lower the Xanax dose and would like to continue the same dose at this time.   3/27/2023: Patient is taking same amount of Xanax/day, but taking less dose in the day time, and states he uses Xanax "as a crutch" to avoid feeling anxious when he is thinking about things in his life that he could change and wants to, but avoids thinking and or doing anything about it because he feels overwhelmed when these thoughts come to him when he is alone in the evening at home.   8/1/2023: Paient continues to remains stable, and states some days he is feeling a little more tried after taking AM Xanax, but is concerned about lowering the dose overall, but agrees to [facility] dosing schedule to take lower dose in AM and monitor if he could consistently lower the dose.   10/23/2023: Patient reports increased stress at work and had a panic attack at work hopes get help from work.    12/11/2023: Patient is doing better, able to cope with stress better, and no panic attacks since last visit.   3/26/2024: Patient reported an increase in anxiety symptoms and also experiencing panic attacks after recent death in the family trip triggered an increase in anxiety symptoms and would beenfit from increasing gabapentin to help with anxiety  4/30/2024: The patient reported improved adherence with gabapentin in PM and learning to cope with cousins' death by proactively taking care of his own health issues.   6/12/2024: The patient is reporting much reduced anxiety since last visit and no panic attacks, good mood, not depressed.   7/29/2024: Patient states though he is not having panic attacks, and generally less anxious, still worries about his health and feeling tired in the day after taking meds despite sleeping well at night.   09/11/2024: The patient states having daily structure, going back to work helping with not feeling tired in the day and switched back Lexapro to AM and he has not had panic attacks and anxiety symptoms are stable and wants to try to lower Xanax dose and if is able to do it consistently wants to stay on 0.5 mg in the afternoon.

## 2024-09-21 NOTE — PLAN
[Medication education provided] : Medication education provided. [Rationale for medication choices, possible risks/precautions, benefits, alternative treatment choices, and consequences of non-treatment discussed] : Rationale for medication choices, possible risks/precautions, benefits, alternative treatment choices, and consequences of non-treatment discussed with patient/family/caregiver  [FreeTextEntry5] : Psychoeducation and supportive therapy provided and recommended med changes   Medication: Continue Xanax 1 mg AM and HS, and 0.75 mg in the afternoon. He may consider lowering the afternoon dose to 0.5 mg  AM and 1 mg afternoon and 1 mg HS may consider lowering AM dose to 0.5 mg, and keep 0.25 mg as needed.  continue Gabapentin 100 mg at 4 PM and 100 mg 9 PM and Lexapro 20 mg/day Educated patient of importance of remaining abstinent from drugs and alcohol.  Emergency procedures were discussed: pt. educated to call 911 or go to nearest ER for worsening of symptoms/suicidal/homicidal ideation.  Continue individual psychotherapy  RTC in 2.5 months or earlier as needed.  Patient expressed understanding and agreement with above plan.   I stop checked today: Reference #: [id number]  Practitioner Count: 1 Pharmacy Count: 1 Current Opioid Prescriptions: 0 Current Benzodiazepine Prescriptions: 3 Current Stimulant Prescriptions: 0  Patient Demographic Information (PDI)     PDI	First Name	Last Name	Birth Date	Gender	Street Address	City	State	Zip Code [place]	1980	Male	[address]	[facility]	[address]  Prescription Information  PDI	My Rx	Current Rx	Drug Type	Rx Written	Rx Dispensed	Drug	Quantity	Days Supply	Prescriber Name	Prescriber BRITTANY #	Payment Method	Dispenser A	Y	Y	B	08/29/2024	08/29/2024	alprazolam 0.5 mg tablet	30	30	Eula Dillon	[id number]	Insurance	[id number] A	Y	Y	B	08/29/2024	08/29/2024	alprazolam 1 mg tablet	60	30	DeviEula paz	[id number]	Insurance	[id number] A	Y	Y	B	08/29/2024	08/29/2024	alprazolam 0.25 mg tablet	30	30	DevineEula aguilar	[id number]	Insurance	[id number] A	Y	N	B	07/29/2024	07/29/2024	alprazolam 0.5 mg tablet	30	30	DeviEula paz	[id number]	Insurance	[id number] A	Y	N	B	07/29/2024	07/29/2024	alprazolam 1 mg tablet	60	30	DeviEula paz	[id number]	Insurance	[id number] A	Y	N	B	07/29/2024	07/29/2024	alprazolam 0.25 mg tablet	30	30	Devineni, Eula	[id number]	Insurance	[id number] A	Y	N	B	06/12/2024	06/29/2024	alprazolam 0.5 mg tablet	30	30	Devineni, Eula	[id number]	Insurance	[id number] A	Y	N	B	06/12/2024	06/29/2024	alprazolam 1 mg tablet	60	30	Devineni, Eula	[id number]	Insurance	[id number] A	Y	N	B	06/12/2024	06/29/2024	alprazolam 0.25 mg tablet	30	30	Devineni, Eula	[id number]	Insurance	[id number] A	Y	N	B	05/30/2024	05/31/2024	alprazolam 0.25 mg tablet	30	30	Devineni, Eula	[id number]	Insurance	[id number] A	Y	N	B	05/30/2024	05/31/2024	alprazolam 1 mg tablet	60	30	Devineni, Eula	[id number]	Insurance	[id number] A	Y	N	B	05/30/2024	05/31/2024	alprazolam 0.5 mg tablet	30	30	Devineni, Eula	[id number]	Insurance	[id number] A	Y	N	B	04/30/2024	05/02/2024	alprazolam 1 mg tablet	60	30	Devineni, Eula	[id number]	Insurance	[id number] A	Y	N	B	04/30/2024	05/02/2024	alprazolam 0.5 mg tablet	30	30	Devineni, Eula	[id number]	Insurance	[id number] A	Y	N	B	04/30/2024	05/02/2024	alprazolam 0.25 mg tablet	30	30	Devineni, Eula	[id number]	Insurance	[id number] A	N	N	B	04/02/2024	04/03/2024	alprazolam 0.5 mg tablet	30	30	TreiteArya rogers	[id number]	Insurance	[id number] A	N	N	B	04/02/2024	04/03/2024	alprazolam 1 mg tablet	60	30	Treitel, Arya	[id number]	Insurance	[id number] A	N	N	B	04/02/2024	04/03/2024	alprazolam 0.25 mg tablet	30	30	Treitel, Arya	[id number]	Insurance	[id number] A	Y	N	B	02/28/2024	03/04/2024	alprazolam 0.5 mg tablet	30	30	Devineni, Eula	[id number]	Insurance	[id number] A	Y	N	B	02/28/2024	03/04/2024	alprazolam 0.25 mg tablet	30	30	Devineni, Eula	[id number]	Insurance	[id number] A	Y	N	B	02/28/2024	03/04/2024	alprazolam 1 mg tablet	60	30	Devineni, Eula	[id number]	Insurance	[id number] A	Y	N	B	02/02/2024	02/03/2024	alprazolam 0.25 mg tablet	30	30	Devineni, Elua	[id number]	Insurance	[id number] A	Y	N	B	02/02/2024	02/03/2024	alprazolam 0.5 mg tablet	30	30	Devineni, Eula	[id number]	Insurance	[id number] A	Y	N	B	02/02/2024	02/03/2024	alprazolam 1 mg tablet	60	30	Devineni, Eula	[id number]	Insurance	[id number] A	Y	N	B	01/02/2024	01/05/2024	alprazolam 0.25 mg tablet	30	30	Devineni, Eula	[id number]	Insurance	[id number] A	Y	N	B	01/02/2024	01/05/2024	alprazolam 1 mg tablet	60	30	Devineni, Eula	[id number]	Insurance	[id number] A	Y	N	B	01/02/2024	01/02/2024	alprazolam 0.5 mg tablet	30	30	Devineni, Eula	[id number]	Insurance	[id number] A	Y	N	B	11/28/2023	12/06/2023	alprazolam 1 mg tablet	60	30	Eula Dillon	[id number]	Insurance	[facility] [id number] A	Y	N	B	11/28/2023	12/06/2023	alprazolam 0.25 mg tablet	30	30	Eula Dillno	[id number]	Insurance	[facility] [id number] A	Y	N	B	11/28/2023	11/29/2023	alprazolam 0.5 mg tablet	30	30	Eula Dillon	[id number]	Insurance	[facility] [id number] A	Y	N	B	10/23/2023	11/08/2023	alprazolam 1 mg tablet	60	30	Eula Dillon	[id number]	Insurance	[facility] [id number] A	Y	N	B	10/23/2023	11/08/2023	alprazolam 0.25 mg tablet	30	30	Eula Dillon	[id number]	Insurance	[facility] [id number] A	Y	N	B	10/05/2023	10/05/2023	alprazolam 0.25 mg tablet	30	30	Eula Dillon	[id number]	Insurance	[facility] [id number] A	Y	N	B	10/05/2023	10/05/2023	alprazolam 1 mg tablet	60	30	Eula Dillon	[id number]	Insurance	[facility] [id number]

## 2024-09-21 NOTE — DISCUSSION/SUMMARY
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 He reports he is on Lexapro 10 mg/day for past 2 years and when he initially started taking Lexapro he noticed a slight reduction of anxiety.  He wants to come off Xanax, but is afraid or withdrawal sx and worsening anxiety   12/13/19: Patient reports he is about the same as last visit, and he tried to lower Xanax dose but not successful, but after education about slow taper and using Gabapentin to help with tapering he states he does want to go ahead with tapering off Xanax, and states he feels comfortable with plan to use Gabapentin to help with taper.  1/8/2020: Patient did not start tapering Xanax as planned, reports he was sick and also more stressed at work, and did not want to start new med along with antibiotics. 2/4/2020: Patient is taking Xanax 3.5 mg/day for past 2 weeks, and taking gabapentin 100 mg BID with good effect, but a bit apprehensive to cut down Xanax dose further at this time.   2/25/2020: Patient reports he feels more anxious when he does not have to work because he does not have a daily routine and also situation at home on some days make him anxious. He states he wants to lower Xanax, but when he feels stressed, even if not sever he takes Xanax to help cope with the situation.  3/24/2020: Patient reports he is managing anxiety and Xanax use better and has used gabapentin as needed for anxiety a few times, in addition to 4 PM and HS dose.  He states he likes weekly Rx and would like to continue the same  Considering he is doing well with Xanax dose reduction, plan to further taper Xanax dose.  6/2/2020: patient states he is doing okay, though has not taken more than rxed Xanax he states he is "struggling", i.e., eagerly waiting for his 4 PM dose; denies depression and denies panic attacks.  7/14/2020: Patient states he is taking Gabapentin as recommended and feels he is able to manage anxiety better and wants to continue with current regimen for another month before attempting to further taper Xanax  10/7/2020: Patient states he feels anxiety and depression sx are stable, wants to continue with current regimen and gradually try on his own to reduce Xanax dose to 3 mg/day.  11/25/2020: Patient remains stable, feels anxious, but agrees he would like to reduce Xanax dose and agrees to a trial of lowering dose.  1/6/2021: Patient was able to reduce Xanax dose down to 1 mg TID, feels gabapentin is helping.  2/17/2021: Patient remains stable with current dose of meds, and coping well with his anxiety and psychological dependence on Xanax 5/3/2021: Patient continues to remain stable, coping well with the recent stressors, adhering to the prescribed medication regimen. 6/14/2021: Patient reports he is continuing to feel stable and feels current medication regimen is helping with anxiety and denies feeling depressed. 08/09/021:  Patient continues to remain stable,and adhering to rxed meds, he was able to gradually taper Xanax from 1 mg QID to 1 mg TID, but as previously anxious and worried about further reduction in dose.   09/14/2021: Patient overall remained stable but reports increase in anxiety related to work stressors which he appears to be coping effectively and feels that the current medication regimen is working better for him and he does not feel ready to further cut down Xanax at this time.  11/15/2021: Patient reports change in work assignment has helped reduce stress and states current meds helping keep his anxiety and depression sx in good control.   1/11/2022: Patient reports he is doing well, anxiety sx in good control and no depression. Patient states he feels he is ready to start reducing Xanax and would like to try on his own to reduce the dose, slowly.   2/09/2022: Patient states he tried to lower Xanax dose to 0.75 mg in AM, but could not do it with cutting Xanax 0.5 mg tab and when he lowered it to 0.5 mg he felt anxious and continued taking 1 mg TID instead. He states he however does want to try lowering Xanax and wants to try a slower taper and wants to take 0.75 mg= 0.5+0.25 mg tabs. Informed patient that when he is due for next Xanax Rx will lower the dose and send 2 weeks Rx to monitor and if tolerating will continue it for a month before next dose reduction   4/26/2022: Patient reports feeling worried lowering his afternoon dose of Xanax, however, no withdrawal sx and wants to continue to keep current dose of Xanax.   6/8/2022: Patient continues to remain stable, anxiety in good control, no depression.   7/20/2022: Patient remains stable and reports anxiety and depression sx in gfood control. Patient reports that he had tried to skip the Xanax 0.25 mg dose a couple of times a week, but he worries if he may have adverse effects, or have withdrawal like seizures and states he realizes it may be more psychological dependence that he has, and will continue to try reducing the dose slowly.   8/17/2022: Patient continues to remain stable reports no symptoms of depression and anxiety symptoms are fairly controlled with the current medication regimen, he is however reluctant to further go down on the Xanax and at this point has not felt the need or is taking more than prescribed.  12/2/2022: Patient overall continues to remain stable with no symptoms of depression and anxiety symptoms fairly good control, slight increase in anxiety stress related to work but feels that the current doses of medications both Xanax and gabapentin are helping and he has not attempted to lower the Xanax dose and would like to continue the same dose at this time.   3/27/2023: Patient is taking same amount of Xanax/day, but taking less dose in the day time, and states he uses Xanax "as a crutch" to avoid feeling anxious when he is thinking about things in his life that he could change and wants to, but avoids thinking and or doing anything about it because he feels overwhelmed when these thoughts come to him when he is alone in the evening at home.   8/1/2023: Paient continues to remains stable, and states some days he is feeling a little more tried after taking AM Xanax, but is concerned about lowering the dose overall, but agrees to [facility] dosing schedule to take lower dose in AM and monitor if he could consistently lower the dose.   10/23/2023: Patient reports increased stress at work and had a panic attack at work hopes get help from work.    12/11/2023: Patient is doing better, able to cope with stress better, and no panic attacks since last visit.   3/26/2024: Patient reported an increase in anxiety symptoms and also experiencing panic attacks after recent death in the family trip triggered an increase in anxiety symptoms and would beenfit from increasing gabapentin to help with anxiety  4/30/2024: The patient reported improved adherence with gabapentin in PM and learning to cope with cousins' death by proactively taking care of his own health issues.   6/12/2024: The patient is reporting much reduced anxiety since last visit and no panic attacks, good mood, not depressed.   7/29/2024: Patient states though he is not having panic attacks, and generally less anxious, still worries about his health and feeling tired in the day after taking meds despite sleeping well at night.   09/11/2024: The patient states having daily structure, going back to work helping with not feeling tired in the day and switched back Lexapro to AM and he has not had panic attacks and anxiety symptoms are stable and wants to try to lower Xanax dose and if is able to do it consistently wants to stay on 0.5 mg in the afternoon.  WFL

## 2024-09-21 NOTE — PHYSICAL EXAM
[None] : none [Average] : average [Cooperative] : cooperative [Full] : full [Clear] : clear [Linear/Goal Directed] : linear/goal directed [None Reported] : none reported [WNL] : within normal limits [FreeTextEntry8] : "okay"  [FreeTextEntry7] : No SI/HI

## 2024-09-21 NOTE — HISTORY OF PRESENT ILLNESS
[FreeTextEntry1] : The patient states since he started working, he switched Lexapro to AM and feels it is no longer making him feel tired in the day.  He reported no panic attacks since last visit.   He reported sleep and appetite: good.  No panic attacks since the last visit.  He denied pervasive sad mood and or anhedonia. Denied feeling hopeless and or helpless and denied passive/active SI  He states he would like to try to lower Xanax dose and if is able to do it consistently wants to stay on 0.5 mg in the afternoon.  Patient denied any alcohol use, any substance abuse. No new medical issues, no new medication since last visit

## 2024-09-21 NOTE — PLAN
[Medication education provided] : Medication education provided. [Rationale for medication choices, possible risks/precautions, benefits, alternative treatment choices, and consequences of non-treatment discussed] : Rationale for medication choices, possible risks/precautions, benefits, alternative treatment choices, and consequences of non-treatment discussed with patient/family/caregiver  [FreeTextEntry5] : Psychoeducation and supportive therapy provided and recommended med changes   Medication: Continue Xanax 1 mg AM and HS, and 0.75 mg in the afternoon. He may consider lowering the afternoon dose to 0.5 mg  AM and 1 mg afternoon and 1 mg HS may consider lowering AM dose to 0.5 mg, and keep 0.25 mg as needed.  continue Gabapentin 100 mg at 4 PM and 100 mg 9 PM and Lexapro 20 mg/day Educated patient of importance of remaining abstinent from drugs and alcohol.  Emergency procedures were discussed: pt. educated to call 911 or go to nearest ER for worsening of symptoms/suicidal/homicidal ideation.  Continue individual psychotherapy  RTC in 2.5 months or earlier as needed.  Patient expressed understanding and agreement with above plan.   I stop checked today: Reference #: [id number]  Practitioner Count: 1 Pharmacy Count: 1 Current Opioid Prescriptions: 0 Current Benzodiazepine Prescriptions: 3 Current Stimulant Prescriptions: 0  Patient Demographic Information (PDI)     PDI	First Name	Last Name	Birth Date	Gender	Street Address	City	State	Zip Code [place]	1980	Male	[address]	[facility]	[address]  Prescription Information  PDI	My Rx	Current Rx	Drug Type	Rx Written	Rx Dispensed	Drug	Quantity	Days Supply	Prescriber Name	Prescriber BRITTANY #	Payment Method	Dispenser A	Y	Y	B	08/29/2024	08/29/2024	alprazolam 0.5 mg tablet	30	30	Eula Dillon	[id number]	Insurance	[id number] A	Y	Y	B	08/29/2024	08/29/2024	alprazolam 1 mg tablet	60	30	DeviEula paz	[id number]	Insurance	[id number] A	Y	Y	B	08/29/2024	08/29/2024	alprazolam 0.25 mg tablet	30	30	DevineEula aguilar	[id number]	Insurance	[id number] A	Y	N	B	07/29/2024	07/29/2024	alprazolam 0.5 mg tablet	30	30	DeviEula paz	[id number]	Insurance	[id number] A	Y	N	B	07/29/2024	07/29/2024	alprazolam 1 mg tablet	60	30	DeviEula paz	[id number]	Insurance	[id number] A	Y	N	B	07/29/2024	07/29/2024	alprazolam 0.25 mg tablet	30	30	Devineni, Eula	[id number]	Insurance	[id number] A	Y	N	B	06/12/2024	06/29/2024	alprazolam 0.5 mg tablet	30	30	Devineni, Eula	[id number]	Insurance	[id number] A	Y	N	B	06/12/2024	06/29/2024	alprazolam 1 mg tablet	60	30	Devineni, Eula	[id number]	Insurance	[id number] A	Y	N	B	06/12/2024	06/29/2024	alprazolam 0.25 mg tablet	30	30	Devineni, Eula	[id number]	Insurance	[id number] A	Y	N	B	05/30/2024	05/31/2024	alprazolam 0.25 mg tablet	30	30	Devineni, Eula	[id number]	Insurance	[id number] A	Y	N	B	05/30/2024	05/31/2024	alprazolam 1 mg tablet	60	30	Devineni, Eula	[id number]	Insurance	[id number] A	Y	N	B	05/30/2024	05/31/2024	alprazolam 0.5 mg tablet	30	30	Devineni, Eula	[id number]	Insurance	[id number] A	Y	N	B	04/30/2024	05/02/2024	alprazolam 1 mg tablet	60	30	Devineni, Eula	[id number]	Insurance	[id number] A	Y	N	B	04/30/2024	05/02/2024	alprazolam 0.5 mg tablet	30	30	Devineni, Eula	[id number]	Insurance	[id number] A	Y	N	B	04/30/2024	05/02/2024	alprazolam 0.25 mg tablet	30	30	Devineni, Eula	[id number]	Insurance	[id number] A	N	N	B	04/02/2024	04/03/2024	alprazolam 0.5 mg tablet	30	30	TreiteArya rogers	[id number]	Insurance	[id number] A	N	N	B	04/02/2024	04/03/2024	alprazolam 1 mg tablet	60	30	Treitel, Arya	[id number]	Insurance	[id number] A	N	N	B	04/02/2024	04/03/2024	alprazolam 0.25 mg tablet	30	30	Treitel, Arya	[id number]	Insurance	[id number] A	Y	N	B	02/28/2024	03/04/2024	alprazolam 0.5 mg tablet	30	30	Devineni, Eula	[id number]	Insurance	[id number] A	Y	N	B	02/28/2024	03/04/2024	alprazolam 0.25 mg tablet	30	30	Devineni, Eula	[id number]	Insurance	[id number] A	Y	N	B	02/28/2024	03/04/2024	alprazolam 1 mg tablet	60	30	Devineni, Eula	[id number]	Insurance	[id number] A	Y	N	B	02/02/2024	02/03/2024	alprazolam 0.25 mg tablet	30	30	Devineni, Eula	[id number]	Insurance	[id number] A	Y	N	B	02/02/2024	02/03/2024	alprazolam 0.5 mg tablet	30	30	Devineni, Eula	[id number]	Insurance	[id number] A	Y	N	B	02/02/2024	02/03/2024	alprazolam 1 mg tablet	60	30	Devineni, Eula	[id number]	Insurance	[id number] A	Y	N	B	01/02/2024	01/05/2024	alprazolam 0.25 mg tablet	30	30	Devineni, Eula	[id number]	Insurance	[id number] A	Y	N	B	01/02/2024	01/05/2024	alprazolam 1 mg tablet	60	30	Devineni, Eula	[id number]	Insurance	[id number] A	Y	N	B	01/02/2024	01/02/2024	alprazolam 0.5 mg tablet	30	30	Devineni, Eula	[id number]	Insurance	[id number] A	Y	N	B	11/28/2023	12/06/2023	alprazolam 1 mg tablet	60	30	Eula Dillon	[id number]	Insurance	[facility] [id number] A	Y	N	B	11/28/2023	12/06/2023	alprazolam 0.25 mg tablet	30	30	Eula Dillon	[id number]	Insurance	[facility] [id number] A	Y	N	B	11/28/2023	11/29/2023	alprazolam 0.5 mg tablet	30	30	Eula Dillon	[id number]	Insurance	[facility] [id number] A	Y	N	B	10/23/2023	11/08/2023	alprazolam 1 mg tablet	60	30	Eula Dillon	[id number]	Insurance	[facility] [id number] A	Y	N	B	10/23/2023	11/08/2023	alprazolam 0.25 mg tablet	30	30	Eula Dillon	[id number]	Insurance	[facility] [id number] A	Y	N	B	10/05/2023	10/05/2023	alprazolam 0.25 mg tablet	30	30	Eula Dillon	[id number]	Insurance	[facility] [id number] A	Y	N	B	10/05/2023	10/05/2023	alprazolam 1 mg tablet	60	30	Eula Dillon	[id number]	Insurance	[facility] [id number]

## 2024-09-26 ENCOUNTER — APPOINTMENT (OUTPATIENT)
Dept: PSYCHIATRY | Facility: CLINIC | Age: 44
End: 2024-09-26
Payer: COMMERCIAL

## 2024-09-26 PROCEDURE — 90837 PSYTX W PT 60 MINUTES: CPT | Mod: 95

## 2024-09-27 NOTE — PLAN
[FreeTextEntry2] : Goal: Client will reduce overall level, frequency and intensity of anxiety so that daily functioning is not impaired Objective: Client will develop appropriate relaxation and diversion activities to decrease level of anxiety. Intervention :Clinician will assist in developing awareness of cognitive messages that reinforce hopelessness and helplessness Intervention: Clinician will utilize logic and reality based thinking to challenge each unhealthy/negative thought for accuracy, replacing it with a positive, accurate thought Intervention: Clinician will teach Client various methods of stress reduction (medications, deep breathing, etc.) and assist in implementing these into daily life [Acceptance and Commitment Therapy] : Acceptance and Commitment Therapy  [Cognitive and/or Behavior Therapy] : Cognitive and/or Behavior Therapy  [Psychodynamic Therapy] : Psychodynamic Therapy  [Psychoeducation] : Psychoeducation  [Skills training (all types)] : Skills training (all types)  [Supportive Therapy] : Supportive Therapy [de-identified] : Client presented as oriented x and with stable mood. Client continues to struggle with lack of motivation and focus for change. CLient is in contemplation stage of change. Supportive therapy provided to help client weigh out pros and cons of making changes for self and healthy risks he can take. Further work to continue in this area as client figures out best plan of action for change.  Session was virtual.  [FreeTextEntry1] : Client to attend weekly sessions to promote stable mental health and reach desired functioning for self.   EMDR Tx plan created and CARLA Screening completed NC: I am not good enough 1. High school-friend were going to college "I didn't go to do anything and prepare" 2. Body and not in good shape. 3. Middle school-pushed along, didn't fail, just felt left behind and always catching up (reports this is when panic attacks started)  Present- Fear of constantly procrastinating, and missing out on life  Future- Die early, or I will never do anything even if I live a long life.  Container excercise and safe place excercise completed.

## 2024-10-02 ENCOUNTER — APPOINTMENT (OUTPATIENT)
Dept: PSYCHIATRY | Facility: CLINIC | Age: 44
End: 2024-10-02
Payer: COMMERCIAL

## 2024-10-02 PROCEDURE — 90834 PSYTX W PT 45 MINUTES: CPT | Mod: 95

## 2024-10-04 NOTE — PLAN
[FreeTextEntry2] : Goal: Client will reduce overall level, frequency and intensity of anxiety so that daily functioning is not impaired Objective: Client will develop appropriate relaxation and diversion activities to decrease level of anxiety. Intervention :Clinician will assist in developing awareness of cognitive messages that reinforce hopelessness and helplessness Intervention: Clinician will utilize logic and reality based thinking to challenge each unhealthy/negative thought for accuracy, replacing it with a positive, accurate thought Intervention: Clinician will teach Client various methods of stress reduction (medications, deep breathing, etc.) and assist in implementing these into daily life [Acceptance and Commitment Therapy] : Acceptance and Commitment Therapy  [Cognitive and/or Behavior Therapy] : Cognitive and/or Behavior Therapy  [Psychodynamic Therapy] : Psychodynamic Therapy  [Psychoeducation] : Psychoeducation  [Skills training (all types)] : Skills training (all types)  [Supportive Therapy] : Supportive Therapy [de-identified] : Client presented as oriented x and with stable mood. Patient shared struggled with OCD and obsession of worries and thoughts. Supportive therapy provided with CBT to re-frame negative thoughts and healthy ways to cope. Pt receptive to same. Further work in this area to continue.  Session was virtual.  [FreeTextEntry1] : Client to attend weekly sessions to promote stable mental health and reach desired functioning for self.   EMDR Tx plan created and CARLA Screening completed NC: I am not good enough 1. High school-friend were going to college "I didn't go to do anything and prepare" 2. Body and not in good shape. 3. Middle school-pushed along, didn't fail, just felt left behind and always catching up (reports this is when panic attacks started)  Present- Fear of constantly procrastinating, and missing out on life  Future- Die early, or I will never do anything even if I live a long life.  Container excercise and safe place excercise completed.

## 2024-10-10 ENCOUNTER — APPOINTMENT (OUTPATIENT)
Dept: PSYCHIATRY | Facility: CLINIC | Age: 44
End: 2024-10-10
Payer: COMMERCIAL

## 2024-10-10 PROCEDURE — 90834 PSYTX W PT 45 MINUTES: CPT | Mod: 95

## 2024-11-07 ENCOUNTER — APPOINTMENT (OUTPATIENT)
Dept: PSYCHIATRY | Facility: CLINIC | Age: 44
End: 2024-11-07
Payer: COMMERCIAL

## 2024-11-07 PROCEDURE — 90834 PSYTX W PT 45 MINUTES: CPT | Mod: 95

## 2024-11-14 ENCOUNTER — APPOINTMENT (OUTPATIENT)
Dept: PSYCHIATRY | Facility: CLINIC | Age: 44
End: 2024-11-14

## 2024-11-14 ENCOUNTER — APPOINTMENT (OUTPATIENT)
Dept: PSYCHIATRY | Facility: CLINIC | Age: 44
End: 2024-11-14
Payer: COMMERCIAL

## 2024-11-14 PROCEDURE — 90834 PSYTX W PT 45 MINUTES: CPT | Mod: 95

## 2024-11-21 ENCOUNTER — APPOINTMENT (OUTPATIENT)
Dept: PSYCHIATRY | Facility: CLINIC | Age: 44
End: 2024-11-21

## 2024-12-18 ENCOUNTER — APPOINTMENT (OUTPATIENT)
Dept: PSYCHIATRY | Facility: CLINIC | Age: 44
End: 2024-12-18
Payer: COMMERCIAL

## 2024-12-18 DIAGNOSIS — F41.1 GENERALIZED ANXIETY DISORDER: ICD-10-CM

## 2024-12-18 DIAGNOSIS — F40.01 AGORAPHOBIA WITH PANIC DISORDER: ICD-10-CM

## 2024-12-18 DIAGNOSIS — F13.20 SEDATIVE, HYPNOTIC OR ANXIOLYTIC DEPENDENCE, UNCOMPLICATED: ICD-10-CM

## 2024-12-18 DIAGNOSIS — Z79.899 OTHER LONG TERM (CURRENT) DRUG THERAPY: ICD-10-CM

## 2024-12-18 PROCEDURE — 99214 OFFICE O/P EST MOD 30 MIN: CPT | Mod: 95

## 2025-03-04 ENCOUNTER — APPOINTMENT (OUTPATIENT)
Dept: PSYCHIATRY | Facility: CLINIC | Age: 45
End: 2025-03-04
Payer: COMMERCIAL

## 2025-03-04 DIAGNOSIS — F13.20 SEDATIVE, HYPNOTIC OR ANXIOLYTIC DEPENDENCE, UNCOMPLICATED: ICD-10-CM

## 2025-03-04 DIAGNOSIS — F40.01 AGORAPHOBIA WITH PANIC DISORDER: ICD-10-CM

## 2025-03-04 DIAGNOSIS — Z79.899 OTHER LONG TERM (CURRENT) DRUG THERAPY: ICD-10-CM

## 2025-03-04 DIAGNOSIS — F41.1 GENERALIZED ANXIETY DISORDER: ICD-10-CM

## 2025-03-04 DIAGNOSIS — F32.A DEPRESSION, UNSPECIFIED: ICD-10-CM

## 2025-03-04 PROCEDURE — 99214 OFFICE O/P EST MOD 30 MIN: CPT | Mod: 95

## 2025-03-29 ENCOUNTER — NON-APPOINTMENT (OUTPATIENT)
Age: 45
End: 2025-03-29

## 2025-05-03 ENCOUNTER — NON-APPOINTMENT (OUTPATIENT)
Age: 45
End: 2025-05-03

## 2025-05-27 ENCOUNTER — APPOINTMENT (OUTPATIENT)
Dept: PULMONOLOGY | Facility: CLINIC | Age: 45
End: 2025-05-27

## 2025-05-28 ENCOUNTER — APPOINTMENT (OUTPATIENT)
Dept: PSYCHIATRY | Facility: CLINIC | Age: 45
End: 2025-05-28

## 2025-05-28 DIAGNOSIS — F13.20 SEDATIVE, HYPNOTIC OR ANXIOLYTIC DEPENDENCE, UNCOMPLICATED: ICD-10-CM

## 2025-05-28 DIAGNOSIS — F41.1 GENERALIZED ANXIETY DISORDER: ICD-10-CM

## 2025-05-28 DIAGNOSIS — F40.01 AGORAPHOBIA WITH PANIC DISORDER: ICD-10-CM

## 2025-05-28 PROCEDURE — 99214 OFFICE O/P EST MOD 30 MIN: CPT | Mod: 95

## 2025-06-12 ENCOUNTER — APPOINTMENT (OUTPATIENT)
Dept: PULMONOLOGY | Facility: CLINIC | Age: 45
End: 2025-06-12

## 2025-08-06 ENCOUNTER — TRANSCRIPTION ENCOUNTER (OUTPATIENT)
Age: 45
End: 2025-08-06

## 2025-08-06 ENCOUNTER — APPOINTMENT (OUTPATIENT)
Dept: PSYCHIATRY | Facility: CLINIC | Age: 45
End: 2025-08-06

## 2025-08-06 DIAGNOSIS — F32.A DEPRESSION, UNSPECIFIED: ICD-10-CM

## 2025-08-06 DIAGNOSIS — Z79.899 OTHER LONG TERM (CURRENT) DRUG THERAPY: ICD-10-CM

## 2025-08-06 DIAGNOSIS — F13.20 SEDATIVE, HYPNOTIC OR ANXIOLYTIC DEPENDENCE, UNCOMPLICATED: ICD-10-CM

## 2025-08-06 DIAGNOSIS — F41.1 GENERALIZED ANXIETY DISORDER: ICD-10-CM

## 2025-08-06 PROCEDURE — G2211 COMPLEX E/M VISIT ADD ON: CPT | Mod: NC,95

## 2025-08-06 PROCEDURE — 99214 OFFICE O/P EST MOD 30 MIN: CPT | Mod: 95
